# Patient Record
Sex: FEMALE | Race: WHITE | NOT HISPANIC OR LATINO | Employment: OTHER | URBAN - METROPOLITAN AREA
[De-identification: names, ages, dates, MRNs, and addresses within clinical notes are randomized per-mention and may not be internally consistent; named-entity substitution may affect disease eponyms.]

---

## 2018-03-28 ENCOUNTER — RESOLUTE PROFESSIONAL BILLING HOSPITAL PROF FEE (OUTPATIENT)
Dept: HOSPITALIST | Facility: MEDICAL CENTER | Age: 63
End: 2018-03-28
Payer: COMMERCIAL

## 2018-03-28 ENCOUNTER — OFFICE VISIT (OUTPATIENT)
Dept: URGENT CARE | Facility: CLINIC | Age: 63
End: 2018-03-28
Payer: COMMERCIAL

## 2018-03-28 ENCOUNTER — APPOINTMENT (OUTPATIENT)
Dept: RADIOLOGY | Facility: MEDICAL CENTER | Age: 63
DRG: 193 | End: 2018-03-28
Attending: EMERGENCY MEDICINE
Payer: COMMERCIAL

## 2018-03-28 ENCOUNTER — HOSPITAL ENCOUNTER (INPATIENT)
Facility: MEDICAL CENTER | Age: 63
LOS: 3 days | DRG: 193 | End: 2018-03-31
Attending: EMERGENCY MEDICINE | Admitting: HOSPITALIST
Payer: COMMERCIAL

## 2018-03-28 VITALS
OXYGEN SATURATION: 86 % | SYSTOLIC BLOOD PRESSURE: 130 MMHG | HEART RATE: 76 BPM | DIASTOLIC BLOOD PRESSURE: 90 MMHG | RESPIRATION RATE: 16 BRPM | BODY MASS INDEX: 25.11 KG/M2 | TEMPERATURE: 98.2 F | WEIGHT: 136.47 LBS | HEIGHT: 62 IN

## 2018-03-28 DIAGNOSIS — E87.1 HYPONATREMIA: ICD-10-CM

## 2018-03-28 DIAGNOSIS — R01.1 HEART MURMUR: ICD-10-CM

## 2018-03-28 DIAGNOSIS — J44.1 ACUTE EXACERBATION OF CHRONIC OBSTRUCTIVE PULMONARY DISEASE (COPD) (HCC): ICD-10-CM

## 2018-03-28 DIAGNOSIS — J18.9 PNEUMONIA DUE TO INFECTIOUS ORGANISM, UNSPECIFIED LATERALITY, UNSPECIFIED PART OF LUNG: ICD-10-CM

## 2018-03-28 DIAGNOSIS — J40 BRONCHITIS: ICD-10-CM

## 2018-03-28 DIAGNOSIS — R06.00 DYSPNEA, UNSPECIFIED TYPE: ICD-10-CM

## 2018-03-28 PROBLEM — Z72.0 TOBACCO ABUSE: Status: ACTIVE | Noted: 2018-03-28

## 2018-03-28 PROBLEM — J10.1 INFLUENZA B: Status: ACTIVE | Noted: 2018-03-28

## 2018-03-28 PROBLEM — D72.819 LEUKOPENIA: Status: ACTIVE | Noted: 2018-03-28

## 2018-03-28 PROBLEM — I10 HTN (HYPERTENSION): Status: ACTIVE | Noted: 2018-03-28

## 2018-03-28 PROBLEM — R09.02 HYPOXIA: Status: ACTIVE | Noted: 2018-03-28

## 2018-03-28 PROBLEM — E87.6 HYPOKALEMIA: Status: ACTIVE | Noted: 2018-03-28

## 2018-03-28 LAB
ALBUMIN SERPL BCP-MCNC: 4.3 G/DL (ref 3.2–4.9)
ALBUMIN/GLOB SERPL: 1.4 G/DL
ALP SERPL-CCNC: 75 U/L (ref 30–99)
ALT SERPL-CCNC: 34 U/L (ref 2–50)
ANION GAP SERPL CALC-SCNC: 9 MMOL/L (ref 0–11.9)
ANISOCYTOSIS BLD QL SMEAR: ABNORMAL
APTT PPP: 31.4 SEC (ref 24.7–36)
AST SERPL-CCNC: 55 U/L (ref 12–45)
BASOPHILS # BLD AUTO: 0.9 % (ref 0–1.8)
BASOPHILS # BLD: 0.02 K/UL (ref 0–0.12)
BILIRUB SERPL-MCNC: 0.5 MG/DL (ref 0.1–1.5)
BLOOD CULTURE HOLD CXBCH: NORMAL
BNP SERPL-MCNC: 53 PG/ML (ref 0–100)
BUN SERPL-MCNC: 9 MG/DL (ref 8–22)
CALCIUM SERPL-MCNC: 9 MG/DL (ref 8.5–10.5)
CHLORIDE SERPL-SCNC: 83 MMOL/L (ref 96–112)
CO2 SERPL-SCNC: 27 MMOL/L (ref 20–33)
CREAT SERPL-MCNC: 0.67 MG/DL (ref 0.5–1.4)
EKG IMPRESSION: NORMAL
EOSINOPHIL # BLD AUTO: 0 K/UL (ref 0–0.51)
EOSINOPHIL NFR BLD: 0 % (ref 0–6.9)
ERYTHROCYTE [DISTWIDTH] IN BLOOD BY AUTOMATED COUNT: 41.9 FL (ref 35.9–50)
FLUAV RNA SPEC QL NAA+PROBE: NEGATIVE
FLUBV RNA SPEC QL NAA+PROBE: POSITIVE
GLOBULIN SER CALC-MCNC: 3.1 G/DL (ref 1.9–3.5)
GLUCOSE SERPL-MCNC: 108 MG/DL (ref 65–99)
HCT VFR BLD AUTO: 47 % (ref 37–47)
HGB BLD-MCNC: 17 G/DL (ref 12–16)
INR PPP: 1.04 (ref 0.87–1.13)
LIPASE SERPL-CCNC: 30 U/L (ref 11–82)
LYMPHOCYTES # BLD AUTO: 0.46 K/UL (ref 1–4.8)
LYMPHOCYTES NFR BLD: 18.4 % (ref 22–41)
MACROCYTES BLD QL SMEAR: ABNORMAL
MAGNESIUM SERPL-MCNC: 1.7 MG/DL (ref 1.5–2.5)
MANUAL DIFF BLD: ABNORMAL
MCH RBC QN AUTO: 31.8 PG (ref 27–33)
MCHC RBC AUTO-ENTMCNC: 36.2 G/DL (ref 33.6–35)
MCV RBC AUTO: 88 FL (ref 81.4–97.8)
MONOCYTES # BLD AUTO: 0.29 K/UL (ref 0–0.85)
MONOCYTES NFR BLD AUTO: 11.4 % (ref 0–13.4)
MORPHOLOGY BLD-IMP: NORMAL
NEUTROPHILS # BLD AUTO: 1.73 K/UL (ref 2–7.15)
NEUTROPHILS NFR BLD: 55.3 % (ref 44–72)
NEUTS BAND NFR BLD MANUAL: 14 % (ref 0–10)
NRBC # BLD AUTO: 0 K/UL
NRBC BLD-RTO: 0 /100 WBC
PHOSPHATE SERPL-MCNC: 3.6 MG/DL (ref 2.5–4.5)
PLATELET # BLD AUTO: 158 K/UL (ref 164–446)
PLATELET BLD QL SMEAR: NORMAL
PMV BLD AUTO: 8.9 FL (ref 9–12.9)
POTASSIUM SERPL-SCNC: 3.5 MMOL/L (ref 3.6–5.5)
PROT SERPL-MCNC: 7.4 G/DL (ref 6–8.2)
PROTHROMBIN TIME: 13.3 SEC (ref 12–14.6)
RBC # BLD AUTO: 5.34 M/UL (ref 4.2–5.4)
RBC BLD AUTO: PRESENT
SODIUM SERPL-SCNC: 119 MMOL/L (ref 135–145)
TROPONIN I SERPL-MCNC: 0.03 NG/ML (ref 0–0.04)
WBC # BLD AUTO: 2.5 K/UL (ref 4.8–10.8)

## 2018-03-28 PROCEDURE — 83735 ASSAY OF MAGNESIUM: CPT

## 2018-03-28 PROCEDURE — 700111 HCHG RX REV CODE 636 W/ 250 OVERRIDE (IP): Performed by: EMERGENCY MEDICINE

## 2018-03-28 PROCEDURE — 94640 AIRWAY INHALATION TREATMENT: CPT

## 2018-03-28 PROCEDURE — 83880 ASSAY OF NATRIURETIC PEPTIDE: CPT

## 2018-03-28 PROCEDURE — 85730 THROMBOPLASTIN TIME PARTIAL: CPT

## 2018-03-28 PROCEDURE — 85610 PROTHROMBIN TIME: CPT

## 2018-03-28 PROCEDURE — A9270 NON-COVERED ITEM OR SERVICE: HCPCS | Performed by: EMERGENCY MEDICINE

## 2018-03-28 PROCEDURE — 700102 HCHG RX REV CODE 250 W/ 637 OVERRIDE(OP): Performed by: HOSPITALIST

## 2018-03-28 PROCEDURE — 770020 HCHG ROOM/CARE - TELE (206)

## 2018-03-28 PROCEDURE — 99223 1ST HOSP IP/OBS HIGH 75: CPT | Performed by: HOSPITALIST

## 2018-03-28 PROCEDURE — 700105 HCHG RX REV CODE 258: Performed by: EMERGENCY MEDICINE

## 2018-03-28 PROCEDURE — 93005 ELECTROCARDIOGRAM TRACING: CPT | Performed by: EMERGENCY MEDICINE

## 2018-03-28 PROCEDURE — 87502 INFLUENZA DNA AMP PROBE: CPT

## 2018-03-28 PROCEDURE — 85007 BL SMEAR W/DIFF WBC COUNT: CPT

## 2018-03-28 PROCEDURE — 85027 COMPLETE CBC AUTOMATED: CPT

## 2018-03-28 PROCEDURE — 700102 HCHG RX REV CODE 250 W/ 637 OVERRIDE(OP): Performed by: EMERGENCY MEDICINE

## 2018-03-28 PROCEDURE — A9270 NON-COVERED ITEM OR SERVICE: HCPCS | Performed by: HOSPITALIST

## 2018-03-28 PROCEDURE — 93005 ELECTROCARDIOGRAM TRACING: CPT

## 2018-03-28 PROCEDURE — 94760 N-INVAS EAR/PLS OXIMETRY 1: CPT

## 2018-03-28 PROCEDURE — 80053 COMPREHEN METABOLIC PANEL: CPT

## 2018-03-28 PROCEDURE — 96374 THER/PROPH/DIAG INJ IV PUSH: CPT

## 2018-03-28 PROCEDURE — 99203 OFFICE O/P NEW LOW 30 MIN: CPT | Performed by: PHYSICIAN ASSISTANT

## 2018-03-28 PROCEDURE — 71045 X-RAY EXAM CHEST 1 VIEW: CPT

## 2018-03-28 PROCEDURE — 83690 ASSAY OF LIPASE: CPT

## 2018-03-28 PROCEDURE — 84484 ASSAY OF TROPONIN QUANT: CPT

## 2018-03-28 PROCEDURE — 99285 EMERGENCY DEPT VISIT HI MDM: CPT

## 2018-03-28 PROCEDURE — 87040 BLOOD CULTURE FOR BACTERIA: CPT

## 2018-03-28 PROCEDURE — 700101 HCHG RX REV CODE 250: Performed by: EMERGENCY MEDICINE

## 2018-03-28 PROCEDURE — 36415 COLL VENOUS BLD VENIPUNCTURE: CPT

## 2018-03-28 PROCEDURE — 700111 HCHG RX REV CODE 636 W/ 250 OVERRIDE (IP): Performed by: HOSPITALIST

## 2018-03-28 PROCEDURE — 96375 TX/PRO/DX INJ NEW DRUG ADDON: CPT

## 2018-03-28 PROCEDURE — 84100 ASSAY OF PHOSPHORUS: CPT

## 2018-03-28 RX ORDER — METHYLPREDNISOLONE SODIUM SUCCINATE 125 MG/2ML
125 INJECTION, POWDER, LYOPHILIZED, FOR SOLUTION INTRAMUSCULAR; INTRAVENOUS ONCE
Status: COMPLETED | OUTPATIENT
Start: 2018-03-28 | End: 2018-03-28

## 2018-03-28 RX ORDER — NICOTINE 21 MG/24HR
21 PATCH, TRANSDERMAL 24 HOURS TRANSDERMAL
Status: DISCONTINUED | OUTPATIENT
Start: 2018-03-28 | End: 2018-03-31 | Stop reason: HOSPADM

## 2018-03-28 RX ORDER — PREDNISONE 20 MG/1
40 TABLET ORAL DAILY
Status: DISCONTINUED | OUTPATIENT
Start: 2018-03-28 | End: 2018-03-31 | Stop reason: HOSPADM

## 2018-03-28 RX ORDER — ONDANSETRON 4 MG/1
4 TABLET, ORALLY DISINTEGRATING ORAL EVERY 4 HOURS PRN
Status: DISCONTINUED | OUTPATIENT
Start: 2018-03-28 | End: 2018-03-31 | Stop reason: HOSPADM

## 2018-03-28 RX ORDER — CEPHALEXIN 500 MG/1
500 CAPSULE ORAL 2 TIMES DAILY
Status: ON HOLD | COMMUNITY
Start: 2018-03-24 | End: 2018-03-31

## 2018-03-28 RX ORDER — LISINOPRIL AND HYDROCHLOROTHIAZIDE 20; 12.5 MG/1; MG/1
1 TABLET ORAL DAILY
COMMUNITY
End: 2018-03-31

## 2018-03-28 RX ORDER — POLYETHYLENE GLYCOL 3350 17 G/17G
1 POWDER, FOR SOLUTION ORAL
Status: DISCONTINUED | OUTPATIENT
Start: 2018-03-28 | End: 2018-03-31 | Stop reason: HOSPADM

## 2018-03-28 RX ORDER — PROMETHAZINE HYDROCHLORIDE 25 MG/1
12.5-25 SUPPOSITORY RECTAL EVERY 4 HOURS PRN
Status: DISCONTINUED | OUTPATIENT
Start: 2018-03-28 | End: 2018-03-31 | Stop reason: HOSPADM

## 2018-03-28 RX ORDER — AMLODIPINE BESYLATE 5 MG/1
5 TABLET ORAL
Status: DISCONTINUED | OUTPATIENT
Start: 2018-03-28 | End: 2018-03-31

## 2018-03-28 RX ORDER — SODIUM CHLORIDE 9 MG/ML
1000 INJECTION, SOLUTION INTRAVENOUS CONTINUOUS
Status: ACTIVE | OUTPATIENT
Start: 2018-03-28 | End: 2018-03-28

## 2018-03-28 RX ORDER — BISACODYL 10 MG
10 SUPPOSITORY, RECTAL RECTAL
Status: DISCONTINUED | OUTPATIENT
Start: 2018-03-28 | End: 2018-03-31 | Stop reason: HOSPADM

## 2018-03-28 RX ORDER — AMOXICILLIN 250 MG
2 CAPSULE ORAL 2 TIMES DAILY
Status: DISCONTINUED | OUTPATIENT
Start: 2018-03-28 | End: 2018-03-31 | Stop reason: HOSPADM

## 2018-03-28 RX ORDER — AZITHROMYCIN 250 MG/1
500 TABLET, FILM COATED ORAL ONCE
Status: COMPLETED | OUTPATIENT
Start: 2018-03-28 | End: 2018-03-28

## 2018-03-28 RX ORDER — IPRATROPIUM BROMIDE AND ALBUTEROL SULFATE 2.5; .5 MG/3ML; MG/3ML
3 SOLUTION RESPIRATORY (INHALATION)
Status: DISCONTINUED | OUTPATIENT
Start: 2018-03-28 | End: 2018-03-29

## 2018-03-28 RX ORDER — AZITHROMYCIN 250 MG/1
500 TABLET, FILM COATED ORAL ONCE
Status: DISCONTINUED | OUTPATIENT
Start: 2018-03-28 | End: 2018-03-28

## 2018-03-28 RX ORDER — LISINOPRIL AND HYDROCHLOROTHIAZIDE 25; 20 MG/1; MG/1
1 TABLET ORAL DAILY
COMMUNITY
End: 2018-03-28

## 2018-03-28 RX ORDER — ONDANSETRON 2 MG/ML
4 INJECTION INTRAMUSCULAR; INTRAVENOUS EVERY 4 HOURS PRN
Status: DISCONTINUED | OUTPATIENT
Start: 2018-03-28 | End: 2018-03-31 | Stop reason: HOSPADM

## 2018-03-28 RX ORDER — AZITHROMYCIN 250 MG/1
250 TABLET, FILM COATED ORAL DAILY
Status: DISCONTINUED | OUTPATIENT
Start: 2018-03-29 | End: 2018-03-29

## 2018-03-28 RX ORDER — IBUPROFEN 200 MG
200 TABLET ORAL EVERY 6 HOURS PRN
COMMUNITY

## 2018-03-28 RX ORDER — AZITHROMYCIN 500 MG/1
500 INJECTION, POWDER, LYOPHILIZED, FOR SOLUTION INTRAVENOUS ONCE
Status: DISCONTINUED | OUTPATIENT
Start: 2018-03-28 | End: 2018-03-28

## 2018-03-28 RX ORDER — OSELTAMIVIR PHOSPHATE 75 MG/1
75 CAPSULE ORAL EVERY 12 HOURS
Status: DISCONTINUED | OUTPATIENT
Start: 2018-03-28 | End: 2018-03-31 | Stop reason: HOSPADM

## 2018-03-28 RX ORDER — AZITHROMYCIN 250 MG/1
250 TABLET, FILM COATED ORAL DAILY
Status: DISCONTINUED | OUTPATIENT
Start: 2018-03-29 | End: 2018-03-28

## 2018-03-28 RX ORDER — ACETAMINOPHEN 325 MG/1
650 TABLET ORAL EVERY 6 HOURS PRN
Status: DISCONTINUED | OUTPATIENT
Start: 2018-03-28 | End: 2018-03-31 | Stop reason: HOSPADM

## 2018-03-28 RX ORDER — CEFTRIAXONE 2 G/1
2 INJECTION, POWDER, FOR SOLUTION INTRAMUSCULAR; INTRAVENOUS ONCE
Status: COMPLETED | OUTPATIENT
Start: 2018-03-28 | End: 2018-03-28

## 2018-03-28 RX ORDER — PROMETHAZINE HYDROCHLORIDE 25 MG/1
12.5-25 TABLET ORAL EVERY 4 HOURS PRN
Status: DISCONTINUED | OUTPATIENT
Start: 2018-03-28 | End: 2018-03-31 | Stop reason: HOSPADM

## 2018-03-28 RX ADMIN — IPRATROPIUM BROMIDE AND ALBUTEROL SULFATE 3 ML: .5; 3 SOLUTION RESPIRATORY (INHALATION) at 15:59

## 2018-03-28 RX ADMIN — SODIUM CHLORIDE 1000 ML: 9 INJECTION, SOLUTION INTRAVENOUS at 13:21

## 2018-03-28 RX ADMIN — AZITHROMYCIN 500 MG: 250 TABLET, FILM COATED ORAL at 13:21

## 2018-03-28 RX ADMIN — CEFTRIAXONE SODIUM 2 G: 2 INJECTION, POWDER, FOR SOLUTION INTRAMUSCULAR; INTRAVENOUS at 13:21

## 2018-03-28 RX ADMIN — AMLODIPINE BESYLATE 5 MG: 5 TABLET ORAL at 15:19

## 2018-03-28 RX ADMIN — METHYLPREDNISOLONE SODIUM SUCCINATE 125 MG: 125 INJECTION, POWDER, FOR SOLUTION INTRAMUSCULAR; INTRAVENOUS at 13:21

## 2018-03-28 RX ADMIN — ENOXAPARIN SODIUM 40 MG: 100 INJECTION SUBCUTANEOUS at 19:59

## 2018-03-28 RX ADMIN — OSELTAMIVIR PHOSPHATE 75 MG: 75 CAPSULE ORAL at 19:58

## 2018-03-28 RX ADMIN — PREDNISONE 40 MG: 20 TABLET ORAL at 15:18

## 2018-03-28 RX ADMIN — IPRATROPIUM BROMIDE AND ALBUTEROL SULFATE 3 ML: .5; 3 SOLUTION RESPIRATORY (INHALATION) at 18:36

## 2018-03-28 ASSESSMENT — COPD QUESTIONNAIRES
HAVE YOU SMOKED AT LEAST 100 CIGARETTES IN YOUR ENTIRE LIFE: YES
DO YOU EVER COUGH UP ANY MUCUS OR PHLEGM?: YES, A FEW DAYS A WEEK OR MONTH
DURING THE PAST 4 WEEKS HOW MUCH DID YOU FEEL SHORT OF BREATH: SOME OF THE TIME
COPD SCREENING SCORE: 6

## 2018-03-28 ASSESSMENT — ENCOUNTER SYMPTOMS
DIARRHEA: 1
MUSCULOSKELETAL NEGATIVE: 1
BLOOD IN STOOL: 0
BACK PAIN: 1
ORTHOPNEA: 0
EYES NEGATIVE: 1
POLYDIPSIA: 1
CLAUDICATION: 0
WEAKNESS: 0
BODY ACHES: 1
FEVER: 0
COUGH: 1
MYALGIAS: 0
CHILLS: 1
VOMITING: 0
EYES NEGATIVE: 1
CHILLS: 0
NEUROLOGICAL NEGATIVE: 1
SINUS PAIN: 0
SPUTUM PRODUCTION: 1
CHILLS: 1
FEVER: 0
FEVER: 1
COUGH: 1
FATIGUE: 1
SHORTNESS OF BREATH: 1
PSYCHIATRIC NEGATIVE: 1
SORE THROAT: 0
NAUSEA: 0
ABDOMINAL PAIN: 0
MYALGIAS: 0
PALPITATIONS: 0
SHORTNESS OF BREATH: 1

## 2018-03-28 ASSESSMENT — COGNITIVE AND FUNCTIONAL STATUS - GENERAL
DAILY ACTIVITIY SCORE: 24
SUGGESTED CMS G CODE MODIFIER MOBILITY: CH
MOBILITY SCORE: 24
SUGGESTED CMS G CODE MODIFIER DAILY ACTIVITY: CH

## 2018-03-28 ASSESSMENT — LIFESTYLE VARIABLES
DO YOU DRINK ALCOHOL: YES
TOTAL SCORE: 0
HAVE PEOPLE ANNOYED YOU BY CRITICIZING YOUR DRINKING: NO
EVER HAD A DRINK FIRST THING IN THE MORNING TO STEADY YOUR NERVES TO GET RID OF A HANGOVER: NO
HOW MANY TIMES IN THE PAST YEAR HAVE YOU HAD 5 OR MORE DRINKS IN A DAY: 0
TOTAL SCORE: 0
TOTAL SCORE: 0
AVERAGE NUMBER OF DAYS PER WEEK YOU HAVE A DRINK CONTAINING ALCOHOL: 7
ON A TYPICAL DAY WHEN YOU DRINK ALCOHOL HOW MANY DRINKS DO YOU HAVE: 2
HAVE YOU EVER FELT YOU SHOULD CUT DOWN ON YOUR DRINKING: NO
EVER HAD A DRINK FIRST THING IN THE MORNING TO STEADY YOUR NERVES TO GET RID OF A HANGOVER: NO
HAVE PEOPLE ANNOYED YOU BY CRITICIZING YOUR DRINKING: NO
HAVE YOU EVER FELT YOU SHOULD CUT DOWN ON YOUR DRINKING: NO
ALCOHOL_USE: YES
CONSUMPTION TOTAL: POSITIVE
TOTAL SCORE: 0
CONSUMPTION TOTAL: INCOMPLETE
EVER_SMOKED: YES
EVER FELT BAD OR GUILTY ABOUT YOUR DRINKING: NO
EVER FELT BAD OR GUILTY ABOUT YOUR DRINKING: NO
TOTAL SCORE: 0
TOTAL SCORE: 0

## 2018-03-28 ASSESSMENT — PAIN SCALES - GENERAL
PAINLEVEL_OUTOF10: 6
PAINLEVEL_OUTOF10: 0
PAINLEVEL_OUTOF10: 0
PAINLEVEL_OUTOF10: 6

## 2018-03-28 NOTE — ED NOTES
Lab called with critical result of sodium at 1311, WBC 1310. Critical lab result read back to lab.   Dr. Enriquez notified of critical lab result at 1314.  Critical lab result read back by Dr. Enriquez.

## 2018-03-28 NOTE — ED NOTES
Med rec updated and complete  Allergies reviewed  Pt had RX bottle of LISINOPRIL/ HCTZ at bedside,  Pt reports had an old RX bottle of KEFLEX 500MG, took it on 3/26/2018 twice a day then stopped it on 3/26/2018, pt thought she had bronchitis.  Pt reports no vitamins.

## 2018-03-28 NOTE — ASSESSMENT & PLAN NOTE
She had been on lisinopril-hctz 20-12.5 daily which will be changed to norvasc given low sodium.  Continue to monitor

## 2018-03-28 NOTE — NON-PROVIDER
Hospital Medicine History and Physical    Date of Service  3/28/2018    Chief Complaint  Chief Complaint   Patient presents with   • Shortness of Breath   • Cough   • Chest Wall Pain       History of Presenting Illness  62 y.o. female who presented 3/28/2018 to urgent care due to consistent, progressive SOB, dry, hacking cough, and chest pain that has been since Saturday. She was told to come to the ED as she was 86% on room air while at the clinic. Her chest pain is only present when coughing and correlated with chest wall, rather than pain due to cardiovascular issues. During this time she has also had constant rhinorrhea, but is unable to describe the discharge. It does not have a foul smell or taste. She denies any sore throat, sinus pressure, ear pain, rashes, or myalgias. She has not had any palpitations or shortness of breath while laying down that has caused her to wake from sleep. She has stairs in her house and is able to climb them multiple times a day without issue or SOB. Due to her job she is in contact with many people and some have had similar illness to her  She drinks approximately 8 large glasses of water a day and is on hctz, which could be attributing to her hyponatremia.    Primary Care Physician  Pcp Pt States None    Consultants  Dr. Juan F Fernandez    Code Status  Full    Review of Systems  Review of Systems   Constitutional: Positive for malaise/fatigue. Negative for chills and fever.        Goes between feeling hot and cold, but never checked her temperature at home, fatigue   HENT: Positive for congestion. Negative for ear pain, sinus pain and sore throat.    Eyes: Negative.    Respiratory: Positive for cough and shortness of breath.         Chest pain   Cardiovascular: Negative for chest pain, palpitations, orthopnea, claudication and leg swelling.   Gastrointestinal: Positive for diarrhea. Negative for abdominal pain, blood in stool, nausea and vomiting.   Genitourinary: Negative for  dysuria, frequency and urgency.   Musculoskeletal: Negative.  Negative for myalgias.   Skin: Negative.  Negative for rash.   Neurological: Negative.  Negative for weakness.   Endo/Heme/Allergies: Positive for polydipsia.   Psychiatric/Behavioral: Negative.         Past Medical History  Past Medical History:   Diagnosis Date   • Hypertension currently controlled on lisinopril-hctz prescribed by her PCP.        Surgical History  -  Section in   - Breast Biopsy in   - Appendectomy in     Medications  - Lisinopril-hctz 20-12.5 daily    Family History  No known family history of lung disease.  Mother - Alzheimer Disease  Father - Type II Diabetes Mellitus and CHF.  age 82 or 83.     Social History  Social History   Substance Use Topics   • Smoking status: Current Every Day Smoker     Packs/day: 2.50     Years: 0.00     Types: Cigarettes   • Smokeless tobacco: Never Used   • Alcohol use Daily alcohol use. Beer or wine, no hard liquor.       Allergies  Allergies   Allergen Reactions   • Penicillins Unspecified     Pt was child when told she was allergic        Physical Exam  Laboratory   Hemodynamics  Temp (24hrs), Av.9 °C (98.4 °F), Min:36.9 °C (98.4 °F), Max:36.9 °C (98.4 °F)   Temperature: 36.9 °C (98.4 °F)  Pulse  Av  Min: 72  Max: 72 Heart Rate (Monitored): 70  Blood Pressure: 132/69, NIBP: (!) 177/72      Respiratory      Respiration: 19, Pulse Oximetry: 92 %             Physical Exam   Constitutional: She appears well-developed and well-nourished.   Cardiovascular: Normal rate, regular rhythm, normal heart sounds and intact distal pulses.    No murmur, rubs, or gallops present. No peripheral edema present.   Pulmonary/Chest: Effort normal.   Bilateral crackles in the lung bases. No wheeze present.   Abdominal: Soft.   Non tender.   Skin: Skin is warm and dry.       Recent Labs      18   1204   WBC  2.5*   RBC  5.34   HEMOGLOBIN  17.0*   HEMATOCRIT  47.0   MCV  88.0   MCH  31.8    MCHC  36.2*   RDW  41.9   PLATELETCT  158*   MPV  8.9*     Recent Labs      03/28/18   1204   SODIUM  119*   POTASSIUM  3.5*   CHLORIDE  83*   CO2  27   GLUCOSE  108*   BUN  9   CREATININE  0.67   CALCIUM  9.0     Recent Labs      03/28/18   1204   ALTSGPT  34   ASTSGOT  55*   ALKPHOSPHAT  75   TBILIRUBIN  0.5   LIPASE  30   GLUCOSE  108*     Recent Labs      03/28/18   1204   APTT  31.4   INR  1.04     Recent Labs      03/28/18   1204   BNPBTYPENAT  53         Lab Results   Component Value Date    TROPONINI 0.03 03/28/2018     Urinalysis:  No results found for: SPECGRAVITY, GLUCOSEUR, KETONES, NITRITE, WBCURINE, RBCURINE, BACTERIA, EPITHELCELL     Imaging  Portable CXR ordered on Admission:  Findings: Mild lung base interstitial opacities.  There is no evidence of pleural effusion.  The heart is normal in size.  No pneumothorax.     Assessment/Plan     - Chest Infection        - Oxygen saturation 86% on room air on admission, was started on O2 therapy per protocol until O2 sat at 94% or greater. O2 sat was up to 92% while taking H&P.       - CXR taken on admission       - To start patient on combivent and azithromycin  - Hyponatremia       - Na 119 on admission       - Polydypsia, will have patient decrease her water intake by half       - BMP ordered for tomorrow to recheck levels  - Hypokalemia       - K 3.5 on admission       - Hctz has been stopped and she has now been started on Norvasc       - BMP ordered for tomorrow to recheck levels  - Leukopenia       - 2.5 on admission       - No previous history of leukopenia       - CBC ordered for tomorrow to trend  - Hypertension       - Currently on lisinopril-hztc, which will now be lisinopril and norvasc       - 177/71 mmHg on admission, however, patient is anxious and not happy about being admitted into hospital. BP was 130/90 while at urgent care.      Hypoxia- (present on admission)   Assessment & Plan    Oxygen saturation was 86% on room air.         Hypokalemia- (present on admission)   Assessment & Plan    K 3.5 on admit.  Hold hctz.        HTN (hypertension)- (present on admission)   Assessment & Plan    She is on lisinopril-hctz 20-12.5 daily        Hyponatremia- (present on admission)   Assessment & Plan    Sodium is 119 on admit.          Leukopenia- (present on admission)   Assessment & Plan    WBC 2.5 on admit.  She does not have a history of low WBC                 Holli Wade, [facility]

## 2018-03-28 NOTE — PROGRESS NOTES
Patient admitted to -829 . Tele box on, patient assessed. Oriented patient to room, skylight, and how to use call light. Call light within reach, bed alarm on, treaded socks on.

## 2018-03-28 NOTE — ED PROVIDER NOTES
ED Provider Note    Scribed for Lala Enriquez M.D. by Lianna Zepeda. 3/28/2018  12:04 PM    Primary care provider: None  Means of arrival: Walk in  History obtained from: Patient  History limited by: None    CHIEF COMPLAINT  Chief Complaint   Patient presents with   • Shortness of Breath       HPI  Luc Corrales is a 62 y.o. female who presents to the Emergency Department for shortness of breath with an onset of 4 days.  Symptoms developed five days ago with a productive cough. She began to experience intermittent shortness of breath the following day which is exacerbated with exertion and smoking. She denies use of inhalers or oxygen for her symptoms. She complains of chest wall pain only with coughing. Associated symptoms include decrease in appetite.  Negative for fevers, chills, nausea, vomiting or abdominal pain. History of hypertension. She denies a history of COPD, emphysema, PE or MI.      REVIEW OF SYSTEMS  HEENT:  No ear pain, congestion, or sore throat   EYES: no discharge, redness, or vision changes  CARDIAC: Positive chest wall pain.  PULMONARY: Positive shortness of breath and productive cough.  GI: No nausea, vomiting or abdominal pain.  : no dysuria, back pain, or hematuria   Neuro: no weakness, numbness, aphasia, or headache  Musculoskeletal: no swelling, deformity, pain, or joint swelling  Endocrine: No fevers or chills.  SKIN: no rash, erythema, or contusions   Constitutional: Positive decrease in appetite.    See history of present illness. All other systems are negative. C.      PAST MEDICAL HISTORY  Patient has a past medical history of Hypertension. She denies a history of COPD, emphysema, PE or MI.      SURGICAL HISTORY  Patient denies a pertinent surgical history.       SOCIAL HISTORY  Social History   Substance Use Topics   • Smoking status: Current Every Day Smoker     Packs/day: 2.50     Years: 0.00     Types: Cigarettes   • Smokeless tobacco: Never Used      History   Drug use:  "No       FAMILY HISTORY  History reviewed. No pertinent family history.      CURRENT MEDICATIONS  Home Medications     Reviewed by Suellen Campos (Pharmacy Tech) on 03/28/18 at 1314  Med List Status: Complete   Medication Last Dose Status   cephALEXin (KEFLEX) 500 MG Cap 3/26/2018 Active   ibuprofen (MOTRIN) 200 MG Tab 3/27/2018 Active   lisinopril-hydrochlorothiazide (PRINZIDE, ZESTORETIC) 20-12.5 MG per tablet 3/28/2018 Active                ALLERGIES  Allergies   Allergen Reactions   • Penicillins Unspecified     Pt was child when told she was allergic       PHYSICAL EXAM  VITAL SIGNS: /69   Pulse 72   Temp 36.9 °C (98.4 °F)   Resp 19   Ht 1.549 m (5' 1\")   Wt 62.4 kg (137 lb 9.1 oz)   SpO2 92%   BMI 25.99 kg/m²       Constitutional: Well developed, Well nourished, No acute distress, Non-toxic appearance.   HEENT: Normocephalic, Atraumatic,  external ears normal, pharynx pink,  Mucous  Membranes moist, No rhinorrhea or mucosal edema  Eyes: PERRL, EOMI, Conjunctiva normal, No discharge.   Neck: Normal range of motion, No tenderness, Supple, No stridor.   Lymphatic: No lymphadenopathy    Cardiovascular: Regular Rate and Rhythm, No rubs, or gallops. Tight blowing murmur left sternal border  Thorax & Lungs: Lungs clear to auscultation bilaterally, No respiratory distress, No wheezes, rhales or rhonchi, No chest wall tenderness.   Abdomen: Bowel sounds normal, Soft, non tender, non distended,  No pulsatile masses., no rebound guarding or peritoneal signs.   Skin: Warm, Dry, No erythema, No rash,   Back:  No CVA tenderness,  No spinal tenderness, bony crepitance, step offs, or instability.   Neurologic: Alert & oriented x 3, Normal motor function, Normal sensory function, No focal deficits noted. Normal reflexes. Normal Cranial Nerves.  Extremities: Equal, intact distal pulses, No cyanosis, clubbing or edema,  No tenderness.   Musculoskeletal: Good range of motion in all major joints. No tenderness " to palpation or major deformities noted.       DIAGNOSTIC STUDIES / PROCEDURES    LABS  Results for orders placed or performed during the hospital encounter of 03/28/18   CBC with Differential   Result Value Ref Range    WBC 2.5 (LL) 4.8 - 10.8 K/uL    RBC 5.34 4.20 - 5.40 M/uL    Hemoglobin 17.0 (H) 12.0 - 16.0 g/dL    Hematocrit 47.0 37.0 - 47.0 %    MCV 88.0 81.4 - 97.8 fL    MCH 31.8 27.0 - 33.0 pg    MCHC 36.2 (H) 33.6 - 35.0 g/dL    RDW 41.9 35.9 - 50.0 fL    Platelet Count 158 (L) 164 - 446 K/uL    MPV 8.9 (L) 9.0 - 12.9 fL    Nucleated RBC 0.00 /100 WBC    NRBC (Absolute) 0.00 K/uL    Neutrophils-Polys 55.30 44.00 - 72.00 %    Lymphocytes 18.40 (L) 22.00 - 41.00 %    Monocytes 11.40 0.00 - 13.40 %    Eosinophils 0.00 0.00 - 6.90 %    Basophils 0.90 0.00 - 1.80 %    Neutrophils (Absolute) 1.73 (L) 2.00 - 7.15 K/uL    Lymphs (Absolute) 0.46 (L) 1.00 - 4.80 K/uL    Monos (Absolute) 0.29 0.00 - 0.85 K/uL    Eos (Absolute) 0.00 0.00 - 0.51 K/uL    Baso (Absolute) 0.02 0.00 - 0.12 K/uL    Anisocytosis 1+     Macrocytosis 1+    Complete Metabolic Panel (CMP)   Result Value Ref Range    Sodium 119 (LL) 135 - 145 mmol/L    Potassium 3.5 (L) 3.6 - 5.5 mmol/L    Chloride 83 (L) 96 - 112 mmol/L    Co2 27 20 - 33 mmol/L    Anion Gap 9.0 0.0 - 11.9    Glucose 108 (H) 65 - 99 mg/dL    Bun 9 8 - 22 mg/dL    Creatinine 0.67 0.50 - 1.40 mg/dL    Calcium 9.0 8.5 - 10.5 mg/dL    AST(SGOT) 55 (H) 12 - 45 U/L    ALT(SGPT) 34 2 - 50 U/L    Alkaline Phosphatase 75 30 - 99 U/L    Total Bilirubin 0.5 0.1 - 1.5 mg/dL    Albumin 4.3 3.2 - 4.9 g/dL    Total Protein 7.4 6.0 - 8.2 g/dL    Globulin 3.1 1.9 - 3.5 g/dL    A-G Ratio 1.4 g/dL   Btype Natriuretic Peptide (BNP)   Result Value Ref Range    B Natriuretic Peptide 53 0 - 100 pg/mL   Prothrombin Time (PT/INR)   Result Value Ref Range    PT 13.3 12.0 - 14.6 sec    INR 1.04 0.87 - 1.13   APTT   Result Value Ref Range    APTT 31.4 24.7 - 36.0 sec   Lipase   Result Value Ref Range     Lipase 30 11 - 82 U/L   Troponin STAT   Result Value Ref Range    Troponin I 0.03 0.00 - 0.04 ng/mL   Magnesium   Result Value Ref Range    Magnesium 1.7 1.5 - 2.5 mg/dL   Phosphorus   Result Value Ref Range    Phosphorus 3.6 2.5 - 4.5 mg/dL   ESTIMATED GFR   Result Value Ref Range    GFR If African American >60 >60 mL/min/1.73 m 2    GFR If Non African American >60 >60 mL/min/1.73 m 2   BLOOD CULTURE,HOLD   Result Value Ref Range    Blood Culture Hold Collected    DIFFERENTIAL MANUAL   Result Value Ref Range    Bands-Stabs 14.00 (H) 0.00 - 10.00 %    Manual Diff Status PERFORMED    PERIPHERAL SMEAR REVIEW   Result Value Ref Range    Peripheral Smear Review see below    PLATELET ESTIMATE   Result Value Ref Range    Plt Estimation Normal    MORPHOLOGY   Result Value Ref Range    RBC Morphology Present    EKG (ER)   Result Value Ref Range    Report       [facility] Emergency Dept.    Test Date:  2018  Pt Name:    ALONZO COLMENARES              Department: ER  MRN:        [id number]                      Room:       [facility]  Gender:     Female                       Technician: [id number]  :        1955                   Requested By:ER TRIAGE PROTOCOL  Order #:    [id number]                    Reading MD:    Measurements  Intervals                                Axis  Rate:       69                           P:          70  KY:         156                          QRS:        63  QRSD:       82                           T:          56  QT:         432  QTc:        463    Interpretive Statements  SINUS RHYTHM  PROBABLE LEFT ATRIAL ABNORMALITY  No previous ECG available for comparison        All labs reviewed by me.      EKG  12 lead EKG; Interpreted by emergency department physician    Rhythm: Normal Sinus Rhythm   Rate: 69  Axis: Normal  R Wave: Normal R wave progression  Normal ST-T segments  ST Segments: No ST segment elevation   T waves: Normal  Q waves: None    Clinical Impression: No acute  changes      RADIOLOGY  DX-CHEST-PORTABLE (1 VIEW)   Final Result      Mild lung base atelectasis/possible interstitial edema or pneumonitis.        The radiologist's interpretation of all radiological studies have been reviewed by me.      COURSE & MEDICAL DECISION MAKING  Pertinent Labs & Imaging studies reviewed. (See chart for details)    Differential Diagnoses include but are not limited to: bronchitis, cardiac ischemia, COPD exacerbation or influenza     12:00 PM Obtained and reviewed patient's electronic medical record which indicates a history of hypertension. Office visit today for a cough.    12:06 PM Patient seen and examined at bedside. Patient presents for shortness of breath.     Initial orders in the Emergency Department included XR chest, EKG and laboratory testing: influenza, CBC with differential, CMP, estimated GFR, BNP, prothrombin time, APTT, lipase, troponin, magnesium and phosphorus.     12:42 PM XR shows mild lung base atelectasis. Ordered 2 g of Rocephin IV, 125 mg of solumedrol and 500 mg of Azithromycin PO. I also ordered normal saline for the hyponatremia.    1:04 PM Spoke with Dr. Fernandez, Hospitalist, regarding the patient's presenting symptoms and diagnostic results. She will be admitted for a new murmur, dyspnea and COPD exacerbation.       DISPOSITION  Patient will be admitted to Dr. Fernandez, Hospitalist, in stable condition.      DIAGNOSIS  1. Acute exacerbation of chronic obstructive pulmonary disease (COPD) (CMS-HCC)    2. Heart murmur    3. Dyspnea, unspecified type    4. Hyponatremia           The note accurately reflects work and decisions made by me.  Lala Enriquez  3/28/2018  2:13 PM     Lianna TERRELL ([facility]), am scribing for, and in the presence of, Lala Enriquez M.D.    Electronically signed by: Lianna Zepdea ([facility]), 3/28/2018    Lala TERRELL M.D. personally performed the services described in this documentation, as scribed by Lianna Zepeda in my presence,  and it is both accurate and complete.

## 2018-03-28 NOTE — ASSESSMENT & PLAN NOTE
Oxygen saturation was 86% on room air.  Improving. Hopefully will be discharged without oxygen next 1-2 days

## 2018-03-28 NOTE — PROGRESS NOTES
"Subjective:      Luc Corrales is a 62 y.o. female who presents with Cough (x3days, chest congestion); Shortness of Breath (SOB, balance is off); Chills; Generalized Body Aches (x5days); Runny Nose; Medication Refill (lisinopril); Back Pain (upper back pain); and Fatigue            Cough   This is a new problem. The current episode started in the past 7 days. The problem has been unchanged. The problem occurs every few minutes. The cough is productive of sputum. Associated symptoms include chills and shortness of breath. Pertinent negatives include no fever or myalgias. Nothing aggravates the symptoms. She has tried nothing for the symptoms. The treatment provided no relief.   Shortness of Breath   Associated symptoms include sputum production. Pertinent negatives include no fever.   Chills   Associated symptoms include chills, coughing and fatigue. Pertinent negatives include no fever or myalgias.   Generalized Body Aches   Associated symptoms include chills, coughing and fatigue. Pertinent negatives include no fever or myalgias.   Back Pain   Pertinent negatives include no fever.   Fatigue   Associated symptoms include chills, coughing and fatigue. Pertinent negatives include no fever or myalgias.       Review of Systems   Constitutional: Positive for chills and fatigue. Negative for fever.   HENT: Negative.    Eyes: Negative.    Respiratory: Positive for cough, sputum production and shortness of breath.    Musculoskeletal: Positive for back pain. Negative for myalgias.   Skin: Negative.           Objective:     /90   Pulse 76   Temp 36.8 °C (98.2 °F)   Resp 16   Ht 1.562 m (5' 1.5\")   Wt 61.9 kg (136 lb 7.4 oz)   SpO2 (!) 86%   BMI 25.37 kg/m²      Physical Exam   Constitutional: She is oriented to person, place, and time. She appears well-developed and well-nourished. No distress.   HENT:   Head: Normocephalic and atraumatic.   Eyes: EOM are normal. Pupils are equal, round, and reactive to " light.   Neck: Normal range of motion. Neck supple.   Cardiovascular: Normal rate.    Neurological: She is alert and oriented to person, place, and time.   Skin: Skin is warm and dry.   Psychiatric: She has a normal mood and affect. Her behavior is normal.   Nursing note and vitals reviewed.    Active Ambulatory Problems     Diagnosis Date Noted   • No Active Ambulatory Problems     Resolved Ambulatory Problems     Diagnosis Date Noted   • No Resolved Ambulatory Problems     No Additional Past Medical History     No current outpatient prescriptions on file prior to visit.     No current facility-administered medications on file prior to visit.      Social History     Social History   • Marital status:      Spouse name: N/A   • Number of children: N/A   • Years of education: N/A     Occupational History   • Not on file.     Social History Main Topics   • Smoking status: Current Every Day Smoker     Packs/day: 2.50     Years: 0.00     Types: Cigarettes   • Smokeless tobacco: Never Used   • Alcohol use Not on file   • Drug use: Unknown   • Sexual activity: Not on file     Other Topics Concern   • Not on file     Social History Narrative   • No narrative on file     No family history on file.  Penicillins              Assessment/Plan:     ·  cough, sob; poss flu sx  · Low sat%86 RA      · No XR here today; will send pt to ER based on low sat% for eval/tx [pt declines EMS]  · Charge notified

## 2018-03-28 NOTE — ED TRIAGE NOTES
Amb to triage, sent from  for further eval. Pt c/o sob, cough, congestion, chest wall pain & back pain x 5 days, getting progressively worse.

## 2018-03-28 NOTE — ED NOTES
Rounded on patient.  Patient stated she would like a small snack.  Reviewed diet order in the computer.  Gave patient crackers and water per request.

## 2018-03-29 PROBLEM — J18.9 PNEUMONIA DUE TO INFECTIOUS ORGANISM: Status: ACTIVE | Noted: 2018-03-29

## 2018-03-29 PROBLEM — J96.01 ACUTE RESPIRATORY FAILURE WITH HYPOXIA (HCC): Status: ACTIVE | Noted: 2018-03-29

## 2018-03-29 LAB
ANION GAP SERPL CALC-SCNC: 9 MMOL/L (ref 0–11.9)
BUN SERPL-MCNC: 8 MG/DL (ref 8–22)
CALCIUM SERPL-MCNC: 8.4 MG/DL (ref 8.5–10.5)
CHLORIDE SERPL-SCNC: 89 MMOL/L (ref 96–112)
CO2 SERPL-SCNC: 26 MMOL/L (ref 20–33)
CREAT SERPL-MCNC: 0.51 MG/DL (ref 0.5–1.4)
GLUCOSE SERPL-MCNC: 201 MG/DL (ref 65–99)
POTASSIUM SERPL-SCNC: 3.5 MMOL/L (ref 3.6–5.5)
SODIUM SERPL-SCNC: 124 MMOL/L (ref 135–145)

## 2018-03-29 PROCEDURE — A9270 NON-COVERED ITEM OR SERVICE: HCPCS | Performed by: HOSPITALIST

## 2018-03-29 PROCEDURE — 99407 BEHAV CHNG SMOKING > 10 MIN: CPT

## 2018-03-29 PROCEDURE — 700102 HCHG RX REV CODE 250 W/ 637 OVERRIDE(OP): Performed by: HOSPITALIST

## 2018-03-29 PROCEDURE — 770020 HCHG ROOM/CARE - TELE (206)

## 2018-03-29 PROCEDURE — A9270 NON-COVERED ITEM OR SERVICE: HCPCS | Performed by: INTERNAL MEDICINE

## 2018-03-29 PROCEDURE — 94760 N-INVAS EAR/PLS OXIMETRY 1: CPT

## 2018-03-29 PROCEDURE — 700101 HCHG RX REV CODE 250: Performed by: EMERGENCY MEDICINE

## 2018-03-29 PROCEDURE — 80048 BASIC METABOLIC PNL TOTAL CA: CPT

## 2018-03-29 PROCEDURE — 99233 SBSQ HOSP IP/OBS HIGH 50: CPT | Performed by: INTERNAL MEDICINE

## 2018-03-29 PROCEDURE — 94640 AIRWAY INHALATION TREATMENT: CPT

## 2018-03-29 PROCEDURE — 36415 COLL VENOUS BLD VENIPUNCTURE: CPT

## 2018-03-29 PROCEDURE — 700102 HCHG RX REV CODE 250 W/ 637 OVERRIDE(OP): Performed by: INTERNAL MEDICINE

## 2018-03-29 PROCEDURE — 700111 HCHG RX REV CODE 636 W/ 250 OVERRIDE (IP): Performed by: HOSPITALIST

## 2018-03-29 RX ORDER — LEVOFLOXACIN 750 MG/1
750 TABLET, FILM COATED ORAL DAILY
Status: DISCONTINUED | OUTPATIENT
Start: 2018-03-29 | End: 2018-03-31 | Stop reason: HOSPADM

## 2018-03-29 RX ORDER — LEVOFLOXACIN 750 MG/1
750 TABLET, FILM COATED ORAL DAILY
Status: DISCONTINUED | OUTPATIENT
Start: 2018-03-29 | End: 2018-03-29

## 2018-03-29 RX ORDER — POTASSIUM CHLORIDE 20 MEQ/1
40 TABLET, EXTENDED RELEASE ORAL ONCE
Status: COMPLETED | OUTPATIENT
Start: 2018-03-29 | End: 2018-03-29

## 2018-03-29 RX ORDER — IPRATROPIUM BROMIDE AND ALBUTEROL SULFATE 2.5; .5 MG/3ML; MG/3ML
3 SOLUTION RESPIRATORY (INHALATION)
Status: DISCONTINUED | OUTPATIENT
Start: 2018-03-29 | End: 2018-03-30

## 2018-03-29 RX ADMIN — IPRATROPIUM BROMIDE AND ALBUTEROL SULFATE 3 ML: .5; 3 SOLUTION RESPIRATORY (INHALATION) at 14:21

## 2018-03-29 RX ADMIN — OSELTAMIVIR PHOSPHATE 75 MG: 75 CAPSULE ORAL at 08:21

## 2018-03-29 RX ADMIN — OSELTAMIVIR PHOSPHATE 75 MG: 75 CAPSULE ORAL at 21:48

## 2018-03-29 RX ADMIN — BENZOCAINE AND MENTHOL 1 LOZENGE: 15; 3.6 LOZENGE ORAL at 17:07

## 2018-03-29 RX ADMIN — IPRATROPIUM BROMIDE AND ALBUTEROL SULFATE 3 ML: .5; 3 SOLUTION RESPIRATORY (INHALATION) at 06:13

## 2018-03-29 RX ADMIN — POTASSIUM CHLORIDE 40 MEQ: 1500 TABLET, EXTENDED RELEASE ORAL at 08:22

## 2018-03-29 RX ADMIN — IPRATROPIUM BROMIDE AND ALBUTEROL SULFATE 3 ML: .5; 3 SOLUTION RESPIRATORY (INHALATION) at 10:08

## 2018-03-29 RX ADMIN — PREDNISONE 40 MG: 20 TABLET ORAL at 08:21

## 2018-03-29 RX ADMIN — AMLODIPINE BESYLATE 5 MG: 5 TABLET ORAL at 08:21

## 2018-03-29 RX ADMIN — LEVOFLOXACIN 750 MG: 750 TABLET, FILM COATED ORAL at 17:07

## 2018-03-29 RX ADMIN — AZITHROMYCIN 250 MG: 250 TABLET, FILM COATED ORAL at 08:21

## 2018-03-29 RX ADMIN — STANDARDIZED SENNA CONCENTRATE AND DOCUSATE SODIUM 2 TABLET: 8.6; 5 TABLET, FILM COATED ORAL at 21:50

## 2018-03-29 ASSESSMENT — ENCOUNTER SYMPTOMS
EYE PAIN: 0
CONSTIPATION: 0
PSYCHIATRIC NEGATIVE: 1
TINGLING: 0
COUGH: 1
PALPITATIONS: 0
CARDIOVASCULAR NEGATIVE: 1
DEPRESSION: 0
SPUTUM PRODUCTION: 1
PHOTOPHOBIA: 0
BLURRED VISION: 0
BRUISES/BLEEDS EASILY: 0
DIZZINESS: 0
HEMOPTYSIS: 0
HEARTBURN: 0
ORTHOPNEA: 0
MYALGIAS: 1
EYES NEGATIVE: 1
DIARRHEA: 0
SHORTNESS OF BREATH: 0
FLANK PAIN: 0
FEVER: 0
WEIGHT LOSS: 0
CHILLS: 1
GASTROINTESTINAL NEGATIVE: 1
SPEECH CHANGE: 0
DOUBLE VISION: 0
WHEEZING: 0

## 2018-03-29 ASSESSMENT — PATIENT HEALTH QUESTIONNAIRE - PHQ9
1. LITTLE INTEREST OR PLEASURE IN DOING THINGS: NOT AT ALL
SUM OF ALL RESPONSES TO PHQ9 QUESTIONS 1 AND 2: 0
2. FEELING DOWN, DEPRESSED, IRRITABLE, OR HOPELESS: NOT AT ALL

## 2018-03-29 ASSESSMENT — PAIN SCALES - GENERAL
PAINLEVEL_OUTOF10: 0

## 2018-03-29 ASSESSMENT — LIFESTYLE VARIABLES: SUBSTANCE_ABUSE: 0

## 2018-03-29 NOTE — CARE PLAN
Problem: Bronchoconstriction:  Goal: Improve in air movement and diminished wheezing  Outcome: PROGRESSING AS EXPECTED    Intervention: Implement inhaled treatments  DUO Q4, appropriate for QID      Problem: Oxygenation:  Goal: Maintain adequate oxygenation dependent on patient condition  Outcome: PROGRESSING AS EXPECTED    Intervention: Manage oxygen therapy by monitoring pulse oximetry and/or ABG values  1.5lpm NC please titrate as tolerated

## 2018-03-29 NOTE — PROGRESS NOTES
Report received from CANDELARIA oCllado. Assumed care of patient at 1845. Telemetry functioning properly. Universal fall precautions in place. Will continue to monitor.

## 2018-03-29 NOTE — PROGRESS NOTES
from Lab called with critical result of Flu B positive at 1658. Critical lab result read back to .   Dr. Fernandez notified of critical lab result at 1735.  Critical lab result read back by Dr. Fernandez.

## 2018-03-29 NOTE — RESPIRATORY CARE
COPD EDUCATION by COPD CLINICAL EDUCATOR  3/29/2018  at  10:20 AM by Tali Krishnan     Patient interviewed by COPD education team.  Patient unable to participate in full program.  Short intervention has been conducted and a comprehensive packet including information about smoking cessation given.

## 2018-03-29 NOTE — CARE PLAN
"Problem: Safety  Goal: Will remain free from falls  Outcome: PROGRESSING AS EXPECTED  [place] fall precautions in place; bed low and locked, non-slip socks on. Pt ambulating safely independently. See \"all risk\" flow sheet for detail. Will continue to monitor.      "

## 2018-03-29 NOTE — H&P
" Hospital Medicine History and Physical    Date of Service  3/28/2018    Chief Complaint  Chief Complaint   Patient presents with   • Shortness of Breath   • Cough   • Chest Wall Pain       History of Presenting Illness  62 y.o. female who presented 3/28/2018 with shortness of breath. Ms. Corrales has a history of hypertension and ongoing smoking dependence developed a cough about 4 days ago with progressive shortness of breath he also states that she felt \"exhausted\" inside a hard time getting out of bed. He said chest pain substernal in nature with her cough and has felt \"hot and cold\". She  Presented to urgent care where she is found to have oxygen saturation of 86% therefore was referred to the emergency room for further workup. Here, she is found to have markedly low sodium at 119 and influenza B positive therefore she'll be started on Tamiflu midexpiratory telemetry floor.   Patient notes that she drinks excessive amounts of water on a daily basis basically explains her hyponatremia. Discussed smoking cessation at great length and she is interested in this. She is not on home oxygen.   Primary Care Physician  Pcp Pt States None    Consultants  none    Code Status  full    Review of Systems  Review of Systems   Constitutional: Positive for chills and fever.   HENT: Positive for congestion.    Respiratory: Positive for cough and shortness of breath.    Neurological:        Feeling off balance the past few days   All other systems reviewed and are negative.       Past Medical History  Past Medical History:   Diagnosis Date   • Hypertension        Surgical History  No past surgical history on file.  Breast biopsy, C/S, [facility]  Medications  No current facility-administered medications on file prior to encounter.      No current outpatient prescriptions on file prior to encounter.   see med rec form    Family History  History reviewed. No pertinent family history.  Father  of heart failure at 82  Social " History  Social History   Substance Use Topics   • Smoking status: Current Every Day Smoker     Packs/day: 2.50     Years: 0.00     Types: Cigarettes   • Smokeless tobacco: Never Used   • Alcohol use Yes   she smokes 2 1/2 packs daily and drinks alcohol socially    Allergies  Allergies   Allergen Reactions   • Penicillins Unspecified     Pt was child when told she was allergic        Physical Exam  Laboratory   Hemodynamics  Temp (24hrs), Av.8 °C (98.2 °F), Min:36.6 °C (97.9 °F), Max:36.9 °C (98.4 °F)   Temperature: 36.6 °C (97.9 °F)  Pulse  Av.2  Min: 72  Max: 98 Heart Rate (Monitored): 79  Blood Pressure: 140/69, NIBP: (!) 176/73      Respiratory      Respiration: 12, Pulse Oximetry: 91 %, O2 Daily Delivery Respiratory : Silicone Nasal Cannula     Given By:: Mouthpiece, Work Of Breathing / Effort: Mild  RUL Breath Sounds: Diminished;Clear, RML Breath Sounds: Diminished;Clear, RLL Breath Sounds: Diminished;Clear, KRIS Breath Sounds: Diminished;Clear, LLL Breath Sounds: Diminished;Clear    Physical Exam   Constitutional: She is oriented to person, place, and time. No distress.   Neck: Neck supple.   Cardiovascular: Normal rate and regular rhythm.    No murmur heard.  Heart sounds are distant though no murmur heard   Pulmonary/Chest:   Decreased air movement, slight expiratory wheezes. No rhonchi, + crackles bilaterally   Abdominal: Soft. She exhibits no distension.   Musculoskeletal: She exhibits no edema.   Neurological: She is alert and oriented to person, place, and time.   Skin: Skin is warm. She is not diaphoretic.   Psychiatric: She has a normal mood and affect. Her behavior is normal.   Nursing note and vitals reviewed.      Recent Labs      18   1204   WBC  2.5*   RBC  5.34   HEMOGLOBIN  17.0*   HEMATOCRIT  47.0   MCV  88.0   MCH  31.8   MCHC  36.2*   RDW  41.9   PLATELETCT  158*   MPV  8.9*     Recent Labs      18   1204   SODIUM  119*   POTASSIUM  3.5*   CHLORIDE  83*   CO2  27    GLUCOSE  108*   BUN  9   CREATININE  0.67   CALCIUM  9.0     Recent Labs      03/28/18   1204   ALTSGPT  34   ASTSGOT  55*   ALKPHOSPHAT  75   TBILIRUBIN  0.5   LIPASE  30   GLUCOSE  108*     Recent Labs      03/28/18   1204   APTT  31.4   INR  1.04     Recent Labs      03/28/18   1204   BNPBTYPENAT  53         Lab Results   Component Value Date    TROPONINI 0.03 03/28/2018     Urinalysis:  No results found for: SPECGRAVITY, GLUCOSEUR, KETONES, NITRITE, WBCURINE, RBCURINE, BACTERIA, EPITHELCELL     Imaging  DX-CHEST-PORTABLE (1 VIEW)   Final Result      Mild lung base atelectasis/possible interstitial edema or pneumonitis.           Assessment/Plan     I anticipate this patient will require at least two midnights for appropriate medical management, necessitating inpatient admission.    * Influenza B- (present on admission)   Assessment & Plan    tamiflu ordered.        Hypoxia- (present on admission)   Assessment & Plan    Oxygen saturation was 86% on room air.        Leukopenia- (present on admission)   Assessment & Plan    WBC 2.5 on admit.  She does not have a history of low WBC  May be due to influenza  Repeat CBC in the morning        Tobacco abuse- (present on admission)   Assessment & Plan    Cessation has been discussed.  Nicotine replacement may be ordered for withdrawal symptoms.        Hypokalemia- (present on admission)   Assessment & Plan    K 3.5 on admit.  Hold hctz.        HTN (hypertension)- (present on admission)   Assessment & Plan    She had been on lisinopril-hctz 20-12.5 daily which will be changed to norvasc given low sodium          Hyponatremia- (present on admission)   Assessment & Plan    Sodium is 119 on admit.  May be due to pneumonia  Stop HCTZ  She has significant polydipsia for be restricted to a normal amount of water and will follow her levels daily.              VTE prophylaxis: lovenox.

## 2018-03-29 NOTE — CARE PLAN
Problem: Bronchoconstriction:  Goal: Improve in air movement and diminished wheezing  Outcome: PROGRESSING AS EXPECTED  Duoneb Q4    Problem: Oxygenation:  Goal: Maintain adequate oxygenation dependent on patient condition  Outcome: PROGRESSING AS EXPECTED  2 L NC

## 2018-03-29 NOTE — PROGRESS NOTES
Pt assessed, VSS, A & O x 4. Pt denies any CP, SOB, dizziness, or other pain. POC explained, pt verbalizes an understanding. Educated pt regarding falls and fall safety, pt verbalizes an understanding. Socks on, call light in place, bed lowered and locked, all comfort measures in place and needs met at this time.

## 2018-03-30 ENCOUNTER — APPOINTMENT (OUTPATIENT)
Dept: RADIOLOGY | Facility: MEDICAL CENTER | Age: 63
DRG: 193 | End: 2018-03-30
Attending: INTERNAL MEDICINE
Payer: COMMERCIAL

## 2018-03-30 LAB
ANION GAP SERPL CALC-SCNC: 6 MMOL/L (ref 0–11.9)
ANISOCYTOSIS BLD QL SMEAR: ABNORMAL
BASOPHILS # BLD AUTO: 0 % (ref 0–1.8)
BASOPHILS # BLD: 0 K/UL (ref 0–0.12)
BUN SERPL-MCNC: 8 MG/DL (ref 8–22)
CALCIUM SERPL-MCNC: 8.4 MG/DL (ref 8.5–10.5)
CHLORIDE SERPL-SCNC: 91 MMOL/L (ref 96–112)
CO2 SERPL-SCNC: 29 MMOL/L (ref 20–33)
CREAT SERPL-MCNC: 0.64 MG/DL (ref 0.5–1.4)
EOSINOPHIL # BLD AUTO: 0 K/UL (ref 0–0.51)
EOSINOPHIL NFR BLD: 0 % (ref 0–6.9)
ERYTHROCYTE [DISTWIDTH] IN BLOOD BY AUTOMATED COUNT: 42.8 FL (ref 35.9–50)
GLUCOSE SERPL-MCNC: 99 MG/DL (ref 65–99)
HCT VFR BLD AUTO: 46.9 % (ref 37–47)
HGB BLD-MCNC: 16.6 G/DL (ref 12–16)
LYMPHOCYTES # BLD AUTO: 0.89 K/UL (ref 1–4.8)
LYMPHOCYTES NFR BLD: 16.5 % (ref 22–41)
MANUAL DIFF BLD: NORMAL
MCH RBC QN AUTO: 31.3 PG (ref 27–33)
MCHC RBC AUTO-ENTMCNC: 35.4 G/DL (ref 33.6–35)
MCV RBC AUTO: 88.5 FL (ref 81.4–97.8)
MICROCYTES BLD QL SMEAR: ABNORMAL
MONOCYTES # BLD AUTO: 0.56 K/UL (ref 0–0.85)
MONOCYTES NFR BLD AUTO: 10.4 % (ref 0–13.4)
MORPHOLOGY BLD-IMP: NORMAL
NEUTROPHILS # BLD AUTO: 3.95 K/UL (ref 2–7.15)
NEUTROPHILS NFR BLD: 69.6 % (ref 44–72)
NEUTS BAND NFR BLD MANUAL: 3.5 % (ref 0–10)
NRBC # BLD AUTO: 0 K/UL
NRBC BLD-RTO: 0 /100 WBC
OSMOLALITY SERPL: 271 MOSM/KG H2O (ref 278–298)
PLATELET # BLD AUTO: 147 K/UL (ref 164–446)
PLATELET BLD QL SMEAR: NORMAL
PMV BLD AUTO: 8.7 FL (ref 9–12.9)
POTASSIUM SERPL-SCNC: 3.6 MMOL/L (ref 3.6–5.5)
RBC # BLD AUTO: 5.3 M/UL (ref 4.2–5.4)
RBC BLD AUTO: PRESENT
SODIUM SERPL-SCNC: 126 MMOL/L (ref 135–145)
TSH SERPL DL<=0.005 MIU/L-ACNC: 1.03 UIU/ML (ref 0.38–5.33)
WBC # BLD AUTO: 5.4 K/UL (ref 4.8–10.8)

## 2018-03-30 PROCEDURE — 700102 HCHG RX REV CODE 250 W/ 637 OVERRIDE(OP): Performed by: HOSPITALIST

## 2018-03-30 PROCEDURE — 83930 ASSAY OF BLOOD OSMOLALITY: CPT

## 2018-03-30 PROCEDURE — A9270 NON-COVERED ITEM OR SERVICE: HCPCS | Performed by: INTERNAL MEDICINE

## 2018-03-30 PROCEDURE — A9270 NON-COVERED ITEM OR SERVICE: HCPCS | Performed by: HOSPITALIST

## 2018-03-30 PROCEDURE — 85027 COMPLETE CBC AUTOMATED: CPT

## 2018-03-30 PROCEDURE — 36415 COLL VENOUS BLD VENIPUNCTURE: CPT

## 2018-03-30 PROCEDURE — 770020 HCHG ROOM/CARE - TELE (206)

## 2018-03-30 PROCEDURE — 700102 HCHG RX REV CODE 250 W/ 637 OVERRIDE(OP): Performed by: INTERNAL MEDICINE

## 2018-03-30 PROCEDURE — 85007 BL SMEAR W/DIFF WBC COUNT: CPT

## 2018-03-30 PROCEDURE — 99232 SBSQ HOSP IP/OBS MODERATE 35: CPT | Performed by: INTERNAL MEDICINE

## 2018-03-30 PROCEDURE — 80048 BASIC METABOLIC PNL TOTAL CA: CPT

## 2018-03-30 PROCEDURE — 84443 ASSAY THYROID STIM HORMONE: CPT

## 2018-03-30 PROCEDURE — 71046 X-RAY EXAM CHEST 2 VIEWS: CPT

## 2018-03-30 PROCEDURE — 700111 HCHG RX REV CODE 636 W/ 250 OVERRIDE (IP): Performed by: HOSPITALIST

## 2018-03-30 RX ORDER — GUAIFENESIN 600 MG/1
600 TABLET, EXTENDED RELEASE ORAL EVERY 12 HOURS
Status: DISCONTINUED | OUTPATIENT
Start: 2018-03-30 | End: 2018-03-31 | Stop reason: HOSPADM

## 2018-03-30 RX ORDER — IPRATROPIUM BROMIDE AND ALBUTEROL SULFATE 2.5; .5 MG/3ML; MG/3ML
3 SOLUTION RESPIRATORY (INHALATION) PRN
Status: DISCONTINUED | OUTPATIENT
Start: 2018-03-30 | End: 2018-03-31 | Stop reason: HOSPADM

## 2018-03-30 RX ADMIN — AMLODIPINE BESYLATE 5 MG: 5 TABLET ORAL at 09:58

## 2018-03-30 RX ADMIN — NICOTINE 21 MG: 21 PATCH, EXTENDED RELEASE TRANSDERMAL at 06:42

## 2018-03-30 RX ADMIN — OSELTAMIVIR PHOSPHATE 75 MG: 75 CAPSULE ORAL at 09:58

## 2018-03-30 RX ADMIN — GUAIFENESIN 600 MG: 600 TABLET, EXTENDED RELEASE ORAL at 22:36

## 2018-03-30 RX ADMIN — GUAIFENESIN 600 MG: 600 TABLET, EXTENDED RELEASE ORAL at 09:58

## 2018-03-30 RX ADMIN — LEVOFLOXACIN 750 MG: 750 TABLET, FILM COATED ORAL at 09:58

## 2018-03-30 RX ADMIN — PREDNISONE 40 MG: 20 TABLET ORAL at 09:58

## 2018-03-30 RX ADMIN — OSELTAMIVIR PHOSPHATE 75 MG: 75 CAPSULE ORAL at 22:36

## 2018-03-30 ASSESSMENT — ENCOUNTER SYMPTOMS
WHEEZING: 0
GASTROINTESTINAL NEGATIVE: 1
SPEECH CHANGE: 0
CHILLS: 1
FEVER: 0
DOUBLE VISION: 0
BLURRED VISION: 0
HEARTBURN: 0
MYALGIAS: 1
EYES NEGATIVE: 1
CARDIOVASCULAR NEGATIVE: 1
EYE PAIN: 0
PSYCHIATRIC NEGATIVE: 1
DEPRESSION: 0
PALPITATIONS: 0
SPUTUM PRODUCTION: 1
WEIGHT LOSS: 0
HEMOPTYSIS: 0
BRUISES/BLEEDS EASILY: 0
FLANK PAIN: 0
PHOTOPHOBIA: 0
ORTHOPNEA: 0
DIARRHEA: 0
DIZZINESS: 0
SHORTNESS OF BREATH: 0
CONSTIPATION: 0
TINGLING: 0
COUGH: 1

## 2018-03-30 ASSESSMENT — LIFESTYLE VARIABLES: SUBSTANCE_ABUSE: 0

## 2018-03-30 ASSESSMENT — PAIN SCALES - GENERAL
PAINLEVEL_OUTOF10: 0

## 2018-03-30 NOTE — PROGRESS NOTES
[facility] Hospitalist Progress Note    Date of Service: 3/29/2018    Chief Complaint  62 y.o. female is still smoking, admitted 3/28/2018 with shortness of breath, cough, positive influenzaB    Interval Problem Update  Patient feels somewhat better.  Denies fever.  Started on Tamiflu  Bandemia noted, started on levofloxacin (switched  from azithromycin)  Sodium improved. Potassium replaced   Wants to go home.        Consultants/Specialty  none    Disposition  Home when medically cleared        Review of Systems   Constitutional: Positive for chills and malaise/fatigue. Negative for fever and weight loss.   HENT: Negative.  Negative for ear pain, hearing loss and tinnitus.    Eyes: Negative.  Negative for blurred vision, double vision, photophobia and pain.   Respiratory: Positive for cough and sputum production. Negative for hemoptysis, shortness of breath and wheezing.    Cardiovascular: Negative.  Negative for chest pain, palpitations and orthopnea.   Gastrointestinal: Negative.  Negative for constipation, diarrhea and heartburn.   Genitourinary: Negative.  Negative for dysuria, flank pain and frequency.   Musculoskeletal: Positive for myalgias.   Skin: Negative for itching and rash.   Neurological: Negative for dizziness, tingling and speech change.   Endo/Heme/Allergies: Negative for environmental allergies. Does not bruise/bleed easily.   Psychiatric/Behavioral: Negative.  Negative for depression, substance abuse and suicidal ideas.   All other systems reviewed and are negative.     Physical Exam  Laboratory/Imaging   Hemodynamics  Temp (24hrs), Av.7 °C (98 °F), Min:36.3 °C (97.4 °F), Max:37 °C (98.6 °F)   Temperature: 36.3 °C (97.4 °F)  Pulse  Av.4  Min: 70  Max: 98    Blood Pressure: 117/68      Respiratory      Respiration: 18, Pulse Oximetry: 90 %, O2 Daily Delivery Respiratory : Silicone Nasal Cannula     Given By:: Mouthpiece, Work Of Breathing / Effort: Mild  RUL Breath Sounds: Clear, RML Breath  Sounds: Clear, RLL Breath Sounds: Diminished, KRIS Breath Sounds: Clear, LLL Breath Sounds: Diminished    Fluids    Intake/Output Summary (Last 24 hours) at 03/29/18 1744  Last data filed at 03/29/18 1500   Gross per 24 hour   Intake              920 ml   Output                0 ml   Net              920 ml       Nutrition  Orders Placed This Encounter   Procedures   • Diet Order     Standing Status:   Standing     Number of Occurrences:   1     Order Specific Question:   Diet:     Answer:   Regular [1]     Physical Exam   Constitutional: She is oriented to person, place, and time. She appears well-developed and well-nourished.   HENT:   Head: Normocephalic and atraumatic.   Eyes: EOM are normal. Pupils are equal, round, and reactive to light.   Neck: Normal range of motion. Neck supple.   Cardiovascular: Normal rate and regular rhythm.    Pulmonary/Chest: Effort normal. No respiratory distress. She has rhonchi.   Abdominal: Soft. Bowel sounds are normal. She exhibits no distension. There is no tenderness. There is no rebound and no guarding.   Musculoskeletal: Normal range of motion.   Neurological: She is alert and oriented to person, place, and time. No cranial nerve deficit.   Skin: Skin is warm and dry.   Psychiatric: She has a normal mood and affect.   Nursing note and vitals reviewed.      Recent Labs      03/28/18   1204   WBC  2.5*   RBC  5.34   HEMOGLOBIN  17.0*   HEMATOCRIT  47.0   MCV  88.0   MCH  31.8   MCHC  36.2*   RDW  41.9   PLATELETCT  158*   MPV  8.9*     Recent Labs      03/28/18   1204  03/29/18   0208   SODIUM  119*  124*   POTASSIUM  3.5*  3.5*   CHLORIDE  83*  89*   CO2  27  26   GLUCOSE  108*  201*   BUN  9  8   CREATININE  0.67  0.51   CALCIUM  9.0  8.4*     Recent Labs      03/28/18   1204   APTT  31.4   INR  1.04     Recent Labs      03/28/18   1204   BNPBTYPENAT  53              Assessment/Plan     * Influenza B- (present on admission)   Assessment & Plan    tamiflu ordered.         Hypoxia- (present on admission)   Assessment & Plan    Oxygen saturation was 86% on room air.  Probably needs home O2 evaluation.        Leukopenia- (present on admission)   Assessment & Plan    WBC 2.5 on admit. + bandemia  She does not have a history of low WBC  Treating  as for pneumonia        Acute respiratory failure with hypoxia (CMS-[facility])   Assessment & Plan    Secondary to pneumonia, influenza, and likely underlying COPD  Treatment :  antibiotics, Tamiflu, steroids, RT/bronchodilators  02 home evaluation        Pneumonia due to infectious organism   Assessment & Plan    -started empirically on  antibiotics  -RT treatment, bronchodilators  -Follow blood and sputum cultures          Tobacco abuse- (present on admission)   Assessment & Plan    Cessation has been discussed.  Nicotine replacement may be ordered for withdrawal symptoms.        Hypokalemia- (present on admission)   Assessment & Plan    K 3.5 on admit.  Hold hctz.  Replace. Monitor        HTN (hypertension)- (present on admission)   Assessment & Plan    She had been on lisinopril-hctz 20-12.5 daily which will be changed to norvasc given low sodium.  Continue to monitor          Hyponatremia- (present on admission)   Assessment & Plan    Improving  Stopped HCTZ on admission          Quality-Core Measures

## 2018-03-30 NOTE — ASSESSMENT & PLAN NOTE
Secondary to pneumonia, influenza, and likely underlying COPD  Treatment :  antibiotics, Tamiflu, steroids, RT/bronchodilators

## 2018-03-30 NOTE — ASSESSMENT & PLAN NOTE
-started empirically on  antibiotics  -RT treatment, bronchodilators  -Follow blood and sputum cultures

## 2018-03-30 NOTE — CARE PLAN
Problem: Knowledge Deficit  Goal: Knowledge of disease process/condition, treatment plan, diagnostic tests, and medications will improve    Intervention: Explain information regarding disease process/condition, treatment plan, diagnostic tests, and medications and document in education  Pt. Educated to hypoxia r/t disease process. Pt verbalized understanding.       Problem: Discharge Barriers/Planning  Goal: Patient's continuum of care needs will be met    Intervention: Involve patient and significant other/support system in setting and prioritizing goals for hospital stay and discharge  Discussed POC with pt r/t disease process and possible home oxygen. Pt verbalized understanding and disapproval for home oxygen.

## 2018-03-30 NOTE — PROGRESS NOTES
Report received by day shift RN. Pt laying in bed awake alert and oriented x 4 with no overt signs of dyspnea. Pt updated to POC and verbalized understanding. Bed locked in low position with fall precautions in place and call light in reach.

## 2018-03-31 VITALS
HEIGHT: 61 IN | HEART RATE: 79 BPM | OXYGEN SATURATION: 88 % | TEMPERATURE: 98.3 F | BODY MASS INDEX: 27.01 KG/M2 | SYSTOLIC BLOOD PRESSURE: 114 MMHG | DIASTOLIC BLOOD PRESSURE: 72 MMHG | WEIGHT: 143.08 LBS | RESPIRATION RATE: 20 BRPM

## 2018-03-31 PROCEDURE — A9270 NON-COVERED ITEM OR SERVICE: HCPCS | Performed by: HOSPITALIST

## 2018-03-31 PROCEDURE — 700102 HCHG RX REV CODE 250 W/ 637 OVERRIDE(OP): Performed by: HOSPITALIST

## 2018-03-31 PROCEDURE — 700111 HCHG RX REV CODE 636 W/ 250 OVERRIDE (IP): Performed by: HOSPITALIST

## 2018-03-31 PROCEDURE — A9270 NON-COVERED ITEM OR SERVICE: HCPCS | Performed by: INTERNAL MEDICINE

## 2018-03-31 PROCEDURE — 700102 HCHG RX REV CODE 250 W/ 637 OVERRIDE(OP): Performed by: INTERNAL MEDICINE

## 2018-03-31 PROCEDURE — 99239 HOSP IP/OBS DSCHRG MGMT >30: CPT | Performed by: INTERNAL MEDICINE

## 2018-03-31 RX ORDER — AMLODIPINE BESYLATE 10 MG/1
10 TABLET ORAL DAILY
Qty: 30 TAB | Refills: 0 | Status: SHIPPED | OUTPATIENT
Start: 2018-04-01 | End: 2018-04-13 | Stop reason: SDUPTHER

## 2018-03-31 RX ORDER — LEVOFLOXACIN 750 MG/1
750 TABLET, FILM COATED ORAL DAILY
Qty: 2 TAB | Refills: 0 | Status: SHIPPED | OUTPATIENT
Start: 2018-04-01 | End: 2018-04-03

## 2018-03-31 RX ORDER — AMLODIPINE BESYLATE 10 MG/1
10 TABLET ORAL
Status: DISCONTINUED | OUTPATIENT
Start: 2018-03-31 | End: 2018-03-31 | Stop reason: HOSPADM

## 2018-03-31 RX ORDER — OSELTAMIVIR PHOSPHATE 75 MG/1
75 CAPSULE ORAL EVERY 12 HOURS
Qty: 2 CAP | Refills: 0 | Status: SHIPPED | OUTPATIENT
Start: 2018-03-31 | End: 2018-04-01

## 2018-03-31 RX ORDER — GUAIFENESIN 600 MG/1
600 TABLET, EXTENDED RELEASE ORAL EVERY 12 HOURS
Qty: 14 TAB | Refills: 0 | Status: SHIPPED | OUTPATIENT
Start: 2018-03-31 | End: 2018-04-07

## 2018-03-31 RX ORDER — PREDNISONE 20 MG/1
20 TABLET ORAL DAILY
Qty: 2 TAB | Refills: 0 | Status: SHIPPED | OUTPATIENT
Start: 2018-03-31 | End: 2018-04-02

## 2018-03-31 RX ADMIN — OSELTAMIVIR PHOSPHATE 75 MG: 75 CAPSULE ORAL at 08:31

## 2018-03-31 RX ADMIN — GUAIFENESIN 600 MG: 600 TABLET, EXTENDED RELEASE ORAL at 08:31

## 2018-03-31 RX ADMIN — NICOTINE 21 MG: 21 PATCH, EXTENDED RELEASE TRANSDERMAL at 06:00

## 2018-03-31 RX ADMIN — PREDNISONE 40 MG: 20 TABLET ORAL at 08:31

## 2018-03-31 RX ADMIN — AMLODIPINE BESYLATE 10 MG: 10 TABLET ORAL at 08:31

## 2018-03-31 RX ADMIN — LEVOFLOXACIN 750 MG: 750 TABLET, FILM COATED ORAL at 08:31

## 2018-03-31 ASSESSMENT — PAIN SCALES - GENERAL
PAINLEVEL_OUTOF10: 0
PAINLEVEL_OUTOF10: 0

## 2018-03-31 NOTE — FACE TO FACE
Face to Face Note  -  Durable Medical Equipment    Hernan Leal M.D. - NPI: [id number]  I certify that this patient is under my care and that they had a durable medical equipment(DME)face to face encounter by myself that meets the physician DME face-to-face encounter requirements with this patient on:    Date of encounter:   Patient:                    MRN:                       YOB: 2017  Luc Corrales  [id number]  1955     The encounter with the patient was in whole, or in part, for the following medical condition, which is the primary reason for durable medical equipment:  Other - Pneumonia    I certify that, based on my findings, the following durable medical equipment is medically necessary:  Oxygen.    HOME O2 Saturation Measurements:(Values must be present for Home Oxygen orders)  Room air sat at rest: 88  Room air sat with amb: 88  With liters of O2: 0.5, O2 sat at rest with O2: 93  With Liters of O2: 0.5, O2 sat with amb with O2 : 91  Is the patient mobile?: Yes    My Clinical findings support the need for the above equipment due to:  Hypoxia    Supporting Symptoms: dyspnea

## 2018-03-31 NOTE — DISCHARGE SUMMARY
CHIEF COMPLAINT ON ADMISSION  Chief Complaint   Patient presents with   • Shortness of Breath   • Cough   • Chest Wall Pain       CODE STATUS  Full Code    HPI & HOSPITAL COURSE  This is a 62 y.o. female with history of hypertension and smoking tobacco, here shortness of breath, cough, diagnosed with with influenza B and pneumonia. For details see H&P note.  Patient was treated with Tamiflu for influenza B. Was treated  WITH antibiotic,steroids and bronchodilators for pneumonia. Counselling For tobacco cessation provided.  Hypokalemia replaced. Due to low sodium, her medications for blood pressure (lisinopril/hydrochlorothiazide) changed to amlodipine. Upon discharge, continue antibiotics and follow-up with PCP. Initially hypoxic, she improved. Patient did no qualify for home 02 upon discharge (Sp02 did not go below 88% on exertion)  The patient met 2-midnight criteria for an inpatient stay at the time of discharge.    Therefore, she is discharged in fair and stable condition with close outpatient follow-up.    SPECIFIC OUTPATIENT FOLLOW-UP  pcp    DISCHARGE PROBLEM LIST  Principal Problem:    Influenza B POA: Yes  Active Problems:    Leukopenia POA: Yes    Hypoxia POA: Yes    Hyponatremia POA: Yes    HTN (hypertension) POA: Yes    Hypokalemia POA: Yes    Tobacco abuse POA: Yes    Pneumonia due to infectious organism POA: Unknown    Acute respiratory failure with hypoxia (CMS-HCC) POA: Unknown  Resolved Problems:    * No resolved hospital problems. *      FOLLOW UP  Future Appointments  Date Time Provider Department [place]   4/2/2018 11:20 AM CARE MANAGER TK BONILLA   4/4/2018 11:40 AM VIDHYA Ross   4/13/2018 2:00 PM Isadora Arroyo M.D. [facility] None     Primary Care Provider    Schedule an appointment as soon as possible for a visit in 1 week  Hospital follow-up appointment with PCP      MEDICATIONS ON DISCHARGE   Luc Corrales   Home Medication Instructions DAVID:[id number]    Printed  on:03/31/18 1643   Medication Information                      amLODIPine (NORVASC) 10 MG Tab  Take 1 Tab by mouth every day.             guaiFENesin LA (MUCINEX) 600 MG TABLET SR 12 HR  Take 1 Tab by mouth every 12 hours for 7 days.             ibuprofen (MOTRIN) 200 MG Tab  Take 200 mg by mouth every 6 hours as needed for Mild Pain.             levoFLOXacin (LEVAQUIN) 750 MG tablet  Take 1 Tab by mouth every day for 2 days.             lisinopril-hydrochlorothiazide (PRINZIDE, ZESTORETIC) 20-12.5 MG per tablet  Take 1 Tab by mouth every day.             oseltamivir (TAMIFLU) 75 MG Cap  Take 1 Cap by mouth every 12 hours for 1 day.             predniSONE (DELTASONE) 20 MG Tab  Take 1 Tab by mouth every day for 2 days.                 DIET  Orders Placed This Encounter   Procedures   • Diet Order     Standing Status:   Standing     Number of Occurrences:   1     Order Specific Question:   Diet:     Answer:   Regular [1]       ACTIVITY  As tolerated.      CONSULTATIONS  none    PROCEDURES  none    LABORATORY  Lab Results   Component Value Date/Time    SODIUM 126 (L) 03/30/2018 09:53 AM    POTASSIUM 3.6 03/30/2018 09:53 AM    CHLORIDE 91 (L) 03/30/2018 09:53 AM    CO2 29 03/30/2018 09:53 AM    GLUCOSE 99 03/30/2018 09:53 AM    BUN 8 03/30/2018 09:53 AM    CREATININE 0.64 03/30/2018 09:53 AM        Lab Results   Component Value Date/Time    WBC 5.4 03/30/2018 09:53 AM    HEMOGLOBIN 16.6 (H) 03/30/2018 09:53 AM    HEMATOCRIT 46.9 03/30/2018 09:53 AM    PLATELETCT 147 (L) 03/30/2018 09:53 AM      DX-CHEST-2 VIEWS   Final Result         Patchy airspace opacities in the bilateral lower lobes could relate to developing pneumonia or worsening atelectasis.      DX-CHEST-PORTABLE (1 VIEW)   Final Result      Mild lung base atelectasis/possible interstitial edema or pneumonitis.            Total time of the discharge process exceeds 45 minutes

## 2018-03-31 NOTE — CARE PLAN
Problem: Discharge Barriers/Planning  Goal: Patient's continuum of care needs will be met    Intervention: Involve patient and significant other/support system in setting and prioritizing goals for hospital stay and discharge  Pt given the indication to ABX r/t pneumonia. Pt verbalized understanding.       Problem: Respiratory:  Goal: Respiratory status will improve    Intervention: Administer and titrate oxygen therapy  Pt on 1.5L NC    Intervention: Educate and encourage coughing and deep breathing  Pt has strong non productive cough.  Intervention: Educate and encourage incentive spirometry usage  Pt has weak effort at 750

## 2018-03-31 NOTE — PROGRESS NOTES
[facility] Hospitalist Progress Note    Date of Service: 3/30/2018    Chief Complaint  62 y.o. female is still smoking, admitted 3/28/2018 with shortness of breath, cough, positive influenzaB    Interval Problem Update  Patient doesn't feel better comparing to yesterday.  Denies fever. Chest x-ray showed worsening of bibasilar patchy opacities. Off oxygen. Incentive spirometry ordered  CBC without evidence of leukocytosis      Consultants/Specialty  none    Disposition  Home when medically cleared        Review of Systems   Constitutional: Positive for chills and malaise/fatigue. Negative for fever and weight loss.   HENT: Negative.  Negative for ear pain, hearing loss and tinnitus.    Eyes: Negative.  Negative for blurred vision, double vision, photophobia and pain.   Respiratory: Positive for cough and sputum production. Negative for hemoptysis, shortness of breath and wheezing.    Cardiovascular: Negative.  Negative for chest pain, palpitations and orthopnea.   Gastrointestinal: Negative.  Negative for constipation, diarrhea and heartburn.   Genitourinary: Negative.  Negative for dysuria, flank pain and frequency.   Musculoskeletal: Positive for myalgias.   Skin: Negative for itching and rash.   Neurological: Negative for dizziness, tingling and speech change.   Endo/Heme/Allergies: Negative for environmental allergies. Does not bruise/bleed easily.   Psychiatric/Behavioral: Negative.  Negative for depression, substance abuse and suicidal ideas.   All other systems reviewed and are negative.     Physical Exam  Laboratory/Imaging   Hemodynamics  Temp (24hrs), Av.9 °C (98.4 °F), Min:36.7 °C (98 °F), Max:37.1 °C (98.7 °F)   Temperature: 36.8 °C (98.3 °F)  Pulse  Av.6  Min: 69  Max: 98    Blood Pressure: 116/69      Respiratory      Respiration: (!) 24, Pulse Oximetry: 91 %, O2 Daily Delivery Respiratory : Silicone Nasal Cannula        RUL Breath Sounds: Clear, RML Breath Sounds: Diminished, RLL Breath Sounds:  Diminished, KRIS Breath Sounds: Clear, LLL Breath Sounds: Diminished    Fluids    Intake/Output Summary (Last 24 hours) at 03/30/18 1727  Last data filed at 03/29/18 2140   Gross per 24 hour   Intake              240 ml   Output                0 ml   Net              240 ml       Nutrition  Orders Placed This Encounter   Procedures   • Diet Order     Standing Status:   Standing     Number of Occurrences:   1     Order Specific Question:   Diet:     Answer:   Regular [1]     Physical Exam   Constitutional: She is oriented to person, place, and time. She appears well-developed and well-nourished.   HENT:   Head: Normocephalic and atraumatic.   Eyes: EOM are normal. Pupils are equal, round, and reactive to light.   Neck: Normal range of motion. Neck supple.   Cardiovascular: Normal rate and regular rhythm.    Pulmonary/Chest: Effort normal. No respiratory distress. She has rhonchi.   Abdominal: Soft. Bowel sounds are normal. She exhibits no distension. There is no tenderness. There is no rebound and no guarding.   Musculoskeletal: Normal range of motion.   Neurological: She is alert and oriented to person, place, and time. No cranial nerve deficit.   Skin: Skin is warm and dry.   Psychiatric: She has a normal mood and affect.   Nursing note and vitals reviewed.      Recent Labs      03/28/18   1204  03/30/18   0953   WBC  2.5*  5.4   RBC  5.34  5.30   HEMOGLOBIN  17.0*  16.6*   HEMATOCRIT  47.0  46.9   MCV  88.0  88.5   MCH  31.8  31.3   MCHC  36.2*  35.4*   RDW  41.9  42.8   PLATELETCT  158*  147*   MPV  8.9*  8.7*     Recent Labs      03/28/18   1204  03/29/18   0208  03/30/18   0953   SODIUM  119*  124*  126*   POTASSIUM  3.5*  3.5*  3.6   CHLORIDE  83*  89*  91*   CO2  27  26  29   GLUCOSE  108*  201*  99   BUN  9  8  8   CREATININE  0.67  0.51  0.64   CALCIUM  9.0  8.4*  8.4*     Recent Labs      03/28/18   1204   APTT  31.4   INR  1.04     Recent Labs      03/28/18   1204   BNPBTYPENAT  53               Assessment/Plan     * Influenza B- (present on admission)   Assessment & Plan    tamiflu ordered.        Hypoxia- (present on admission)   Assessment & Plan    Oxygen saturation was 86% on room air.  Improving. Hopefully will be discharged without oxygen next 1-2 days        Leukopenia- (present on admission)   Assessment & Plan    WBC 2.5 on admit. + bandemia  She does not have a history of low WBC  Treating  as for pneumonia        Acute respiratory failure with hypoxia (CMS-[facility])   Assessment & Plan    Secondary to pneumonia, influenza, and likely underlying COPD  Treatment :  antibiotics, Tamiflu, steroids, RT/bronchodilators          Pneumonia due to infectious organism   Assessment & Plan    -started empirically on  antibiotics  -RT treatment, bronchodilators  -Follow blood and sputum cultures          Tobacco abuse- (present on admission)   Assessment & Plan    Cessation has been discussed.  Nicotine replacement may be ordered for withdrawal symptoms.        Hypokalemia- (present on admission)   Assessment & Plan    K 3.5 on admit.  Hold hctz.  Replace. Monitor        HTN (hypertension)- (present on admission)   Assessment & Plan    She had been on lisinopril-hctz 20-12.5 daily which will be changed to norvasc given low sodium.  Continue to monitor          Hyponatremia- (present on admission)   Assessment & Plan    Improving  Stopped HCTZ on admission          Quality-Core Measures

## 2018-03-31 NOTE — CARE PLAN
Problem: Safety  Goal: Will remain free from injury  Outcome: PROGRESSING AS EXPECTED  Fall precautions in place. Bed in lowest position. Non-skid socks in place. Personal possessions within reach. Mobility sign on door. Bed-alarm on. Call light within reach. Pt educated regarding fall prevention and states understanding.     Problem: Knowledge Deficit  Goal: Knowledge of the prescribed therapeutic regimen will improve  Outcome: PROGRESSING AS EXPECTED  Pt educated regarding plan of care and medications. All questions answered.

## 2018-03-31 NOTE — PROGRESS NOTES
Assumed care at 0700. Bedside report received from Reinaldo. Patient's chart and MAR reviewed. Pt denies pain at this time. Pt is A & O 4, on 1.5L nasal cannula O2 (hot at home). Patient was updated on plan of care for the day. Questions answered and concerns addressed.  Pt denies any additional needs at this time. White board updated. Call light, phone and personal belongings within reach.

## 2018-03-31 NOTE — DISCHARGE INSTRUCTIONS
Discharge Instructions    Discharged to home by car with friend. Discharged via wheelchair, hospital escort: Yes.  Special equipment needed: Not Applicable    Be sure to schedule a follow-up appointment with your primary care doctor or any specialists as instructed.     Discharge Plan:   Diet Plan: Discussed  Activity Level: Discussed  Smoking Cessation Offered: Patient Counseled  Confirmed Follow up Appointment: Appointment Scheduled  Confirmed Symptoms Management: Discussed  Medication Reconciliation Updated: Yes  Influenza Vaccine Indication: Patient Refuses    I understand that a diet low in cholesterol, fat, and sodium is recommended for good health. Unless I have been given specific instructions below for another diet, I accept this instruction as my diet prescription.   Other diet:     Special Instructions: None    · Is patient discharged on Warfarin / Coumadin?   No       Community-Acquired Pneumonia, Adult  Introduction  Pneumonia is an infection of the lungs. One type of pneumonia can happen while a person is in a hospital. A different type can happen when a person is not in a hospital (community-acquired pneumonia). It is easy for this kind to spread from person to person. It can spread to you if you breathe near an infected person who coughs or sneezes. Some symptoms include:  · A dry cough.  · A wet (productive) cough.  · Fever.  · Sweating.  · Chest pain.  Follow these instructions at home:  · Take over-the-counter and prescription medicines only as told by your doctor.  ¨ Only take cough medicine if you are losing sleep.  ¨ If you were prescribed an antibiotic medicine, take it as told by your doctor. Do not stop taking the antibiotic even if you start to feel better.  · Sleep with your head and neck raised (elevated). You can do this by putting a few pillows under your head, or you can sleep in a recliner.  · Do not use tobacco products. These include cigarettes, chewing tobacco, and e-cigarettes. If  you need help quitting, ask your doctor.  · Drink enough water to keep your pee (urine) clear or pale yellow.  A shot (vaccine) can help prevent pneumonia. Shots are often suggested for:  · People older than 65 years of age.  · People older than 19 years of age:  ¨ Who are having cancer treatment.  ¨ Who have long-term (chronic) lung disease.  ¨ Who have problems with their body's defense system (immune system).  You may also prevent pneumonia if you take these actions:  · Get the flu (influenza) shot every year.  · Go to the dentist as often as told.  · Wash your hands often. If soap and water are not available, use hand .  Contact a doctor if:  · You have a fever.  · You lose sleep because your cough medicine does not help.  Get help right away if:  · You are short of breath and it gets worse.  · You have more chest pain.  · Your sickness gets worse. This is very serious if:  ¨ You are an older adult.  ¨ Your body's defense system is weak.  · You cough up blood.  This information is not intended to replace advice given to you by your health care provider. Make sure you discuss any questions you have with your health care provider.  Document Released: 06/05/2009 Document Revised: 05/25/2017 Document Reviewed: 04/13/2016  © 2017 Elsevier      Steps to Quit Smoking  Smoking tobacco can be bad for your health. It can also affect almost every organ in your body. Smoking puts you and people around you at risk for many serious long-lasting (chronic) diseases. Quitting smoking is hard, but it is one of the best things that you can do for your health. It is never too late to quit.  What are the benefits of quitting smoking?  When you quit smoking, you lower your risk for getting serious diseases and conditions. They can include:  · Lung cancer or lung disease.  · Heart disease.  · Stroke.  · Heart attack.  · Not being able to have children (infertility).  · Weak bones (osteoporosis) and broken bones  (fractures).  If you have coughing, wheezing, and shortness of breath, those symptoms may get better when you quit. You may also get sick less often. If you are pregnant, quitting smoking can help to lower your chances of having a baby of low birth weight.  What can I do to help me quit smoking?  Talk with your doctor about what can help you quit smoking. Some things you can do (strategies) include:  · Quitting smoking totally, instead of slowly cutting back how much you smoke over a period of time.  · Going to in-person counseling. You are more likely to quit if you go to many counseling sessions.  · Using resources and support systems, such as:  ¨ Online chats with a counselor.  ¨ Phone quitlines.  ¨ Printed self-help materials.  ¨ Support groups or group counseling.  ¨ Text messaging programs.  ¨ Mobile phone apps or applications.  · Taking medicines. Some of these medicines may have nicotine in them. If you are pregnant or breastfeeding, do not take any medicines to quit smoking unless your doctor says it is okay. Talk with your doctor about counseling or other things that can help you.  Talk with your doctor about using more than one strategy at the same time, such as taking medicines while you are also going to in-person counseling. This can help make quitting easier.  What things can I do to make it easier to quit?  Quitting smoking might feel very hard at first, but there is a lot that you can do to make it easier. Take these steps:  · Talk to your family and friends. Ask them to support and encourage you.  · Call phone quitlines, reach out to support groups, or work with a counselor.  · Ask people who smoke to not smoke around you.  · Avoid places that make you want (trigger) to smoke, such as:  ¨ Bars.  ¨ Parties.  ¨ Smoke-break areas at work.  · Spend time with people who do not smoke.  · Lower the stress in your life. Stress can make you want to smoke. Try these things to help your stress:  ¨ Getting  regular exercise.  ¨ Deep-breathing exercises.  ¨ Yoga.  ¨ Meditating.  ¨ Doing a body scan. To do this, close your eyes, focus on one area of your body at a time from head to toe, and notice which parts of your body are tense. Try to relax the muscles in those areas.  · Download or buy apps on your mobile phone or tablet that can help you stick to your quit plan. There are many free apps, such as QuitGuide from the CDC (Centers for Disease Control and Prevention). You can find more support from smokefree.gov and other websites.  This information is not intended to replace advice given to you by your health care provider. Make sure you discuss any questions you have with your health care provider.  Document Released: 10/14/2010 Document Revised: 08/15/2017 Document Reviewed: 05/03/2016  [employer] Interactive Patient Education © 2017 [employer]    Depression / Suicide Risk    As you are discharged from this [facility] facility, it is important to learn how to keep safe from harming yourself.    Recognize the warning signs:  · Abrupt changes in personality, positive or negative- including increase in energy   · Giving away possessions  · Change in eating patterns- significant weight changes-  positive or negative  · Change in sleeping patterns- unable to sleep or sleeping all the time   · Unwillingness or inability to communicate  · Depression  · Unusual sadness, discouragement and loneliness  · Talk of wanting to die  · Neglect of personal appearance   · Rebelliousness- reckless behavior  · Withdrawal from people/activities they love  · Confusion- inability to concentrate     If you or a loved one observes any of these behaviors or has concerns about self-harm, here's what you can do:  · Talk about it- your feelings and reasons for harming yourself  · Remove any means that you might use to hurt yourself (examples: pills, rope, extension cords, firearm)  · Get professional help from the community (Mental Health,  Substance Abuse, psychological counseling)  · Do not be alone:Call your Safe Contact- someone whom you trust who will be there for you.  · Call your local CRISIS HOTLINE 573-6382 or 096-471-9172  · Call your local Children's Mobile Crisis Response Team [place] (286) 953-9301 or www.Feedgen  · Call the toll free National Suicide Prevention Hotlines   · National Suicide Prevention Lifeline 020-466-YQHB (4579)  · National Hope Line Network 800-SUICIDE (007-8781)

## 2018-03-31 NOTE — PROGRESS NOTES
Pt assessed, VSS, A & O x 4. Pt denies any CP,  dizziness, or other pain. SOB on exertion. POC explained, pt verbalizes an understanding. Educated pt regarding falls and fall safety, pt verbalizes an understanding. Socks on, call light in place, bed lowered and locked, all comfort measures in place and needs met at this time.

## 2018-03-31 NOTE — PROGRESS NOTES
Report received by day shift RN. Pt laying in bed awake alert and oriented x 4 with no c/o pain or dyspnea. Bed locked in low position with fall precautions in place and call light in reach.

## 2018-04-01 NOTE — PROGRESS NOTES
Pt dc'd 1530. IV and monitor removed; monitor room notified. Pt left unit via wheelchair with RN. Personal belongings with pt when leaving unit. Pt given discharge instructions prior to leaving unit including prescription and when to visit with physician; verbalizes understanding. Copy of discharge instructions with pt and in the chart.

## 2018-04-02 ENCOUNTER — PATIENT OUTREACH (OUTPATIENT)
Dept: HEALTH INFORMATION MANAGEMENT | Facility: OTHER | Age: 63
End: 2018-04-02

## 2018-04-02 LAB
BACTERIA BLD CULT: NORMAL
BACTERIA BLD CULT: NORMAL
SIGNIFICANT IND 70042: NORMAL
SIGNIFICANT IND 70042: NORMAL
SITE SITE: NORMAL
SITE SITE: NORMAL
SOURCE SOURCE: NORMAL
SOURCE SOURCE: NORMAL

## 2018-04-13 ENCOUNTER — OFFICE VISIT (OUTPATIENT)
Dept: MEDICAL GROUP | Facility: MEDICAL CENTER | Age: 63
End: 2018-04-13
Payer: COMMERCIAL

## 2018-04-13 VITALS
TEMPERATURE: 99.3 F | BODY MASS INDEX: 25.01 KG/M2 | RESPIRATION RATE: 16 BRPM | DIASTOLIC BLOOD PRESSURE: 64 MMHG | HEART RATE: 84 BPM | WEIGHT: 135.91 LBS | OXYGEN SATURATION: 94 % | SYSTOLIC BLOOD PRESSURE: 132 MMHG | HEIGHT: 62 IN

## 2018-04-13 DIAGNOSIS — J45.909 REACTIVE AIRWAY DISEASE WITHOUT COMPLICATION, UNSPECIFIED ASTHMA SEVERITY, UNSPECIFIED WHETHER PERSISTENT: ICD-10-CM

## 2018-04-13 DIAGNOSIS — J18.9 PNEUMONIA DUE TO INFECTIOUS ORGANISM, UNSPECIFIED LATERALITY, UNSPECIFIED PART OF LUNG: ICD-10-CM

## 2018-04-13 DIAGNOSIS — Z12.11 SCREENING FOR COLON CANCER: ICD-10-CM

## 2018-04-13 DIAGNOSIS — G47.00 INSOMNIA, UNSPECIFIED TYPE: ICD-10-CM

## 2018-04-13 DIAGNOSIS — I10 ESSENTIAL HYPERTENSION: ICD-10-CM

## 2018-04-13 DIAGNOSIS — Z72.0 TOBACCO ABUSE: ICD-10-CM

## 2018-04-13 PROBLEM — J10.1 INFLUENZA B: Status: RESOLVED | Noted: 2018-03-28 | Resolved: 2018-04-13

## 2018-04-13 PROBLEM — E87.1 HYPONATREMIA: Status: RESOLVED | Noted: 2018-03-28 | Resolved: 2018-04-13

## 2018-04-13 PROBLEM — D72.819 LEUKOPENIA: Status: RESOLVED | Noted: 2018-03-28 | Resolved: 2018-04-13

## 2018-04-13 PROBLEM — E87.6 HYPOKALEMIA: Status: RESOLVED | Noted: 2018-03-28 | Resolved: 2018-04-13

## 2018-04-13 PROCEDURE — 99214 OFFICE O/P EST MOD 30 MIN: CPT | Performed by: FAMILY MEDICINE

## 2018-04-13 RX ORDER — CODEINE PHOSPHATE AND GUAIFENESIN 10; 100 MG/5ML; MG/5ML
SOLUTION ORAL
Qty: 1 BOTTLE | Refills: 0 | Status: SHIPPED | OUTPATIENT
Start: 2018-04-13 | End: 2018-05-13

## 2018-04-13 RX ORDER — ALBUTEROL SULFATE 90 UG/1
2 AEROSOL, METERED RESPIRATORY (INHALATION) EVERY 6 HOURS PRN
Qty: 1 INHALER | Refills: 3 | Status: SHIPPED | OUTPATIENT
Start: 2018-04-13 | End: 2018-04-13 | Stop reason: SDUPTHER

## 2018-04-13 RX ORDER — AMLODIPINE BESYLATE 10 MG/1
10 TABLET ORAL DAILY
Qty: 90 TAB | Refills: 3 | Status: SHIPPED | OUTPATIENT
Start: 2018-04-13 | End: 2018-07-13

## 2018-04-13 RX ORDER — ALBUTEROL SULFATE 90 UG/1
2 AEROSOL, METERED RESPIRATORY (INHALATION) EVERY 6 HOURS PRN
Qty: 1 INHALER | Refills: 3 | Status: SHIPPED | OUTPATIENT
Start: 2018-04-13 | End: 2020-11-03

## 2018-04-13 RX ORDER — TRAZODONE HYDROCHLORIDE 50 MG/1
TABLET ORAL
Qty: 60 TAB | Refills: 3 | Status: SHIPPED | OUTPATIENT
Start: 2018-04-13 | End: 2018-07-13

## 2018-04-13 NOTE — ASSESSMENT & PLAN NOTE
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

## 2018-04-13 NOTE — ASSESSMENT & PLAN NOTE
Doing well on room air  Still tired and coughing but no fevers no pleurisy overall improving   We should consider 6-8 weeks post pneumonia follow up cxr however she is not interested in follow up studies

## 2018-04-13 NOTE — PROGRESS NOTES
"This medical record contains text that has been entered with the assistance of computer voice recognition and dictation software.  Therefore, it may contain unintended errors in text, spelling, punctuation, or grammar        Chief Complaint   Patient presents with   • Establish Care       Luc Corrales is a 62 y.o. female here evaluation and management of: post hospital hypoxia follow up and htn and insomnia (new problem)       HPI:     \"HPI & HOSPITAL COURSE  This is a 62 y.o. female with history of hypertension and smoking tobacco, here shortness of breath, cough, diagnosed with with influenza B and pneumonia. For details see H&P note.  Patient was treated with Tamiflu for influenza B. Was treated  WITH antibiotic,steroids and bronchodilators for pneumonia. Counselling For tobacco cessation provided.  Hypokalemia replaced. Due to low sodium, her medications for blood pressure (lisinopril/hydrochlorothiazide) changed to amlodipine. Upon discharge, continue antibiotics and follow-up with PCP. Initially hypoxic, she improved. Patient did no qualify for home 02 upon discharge (Sp02 did not go below 88% on exertion)  The patient met 2-midnight criteria for an inpatient stay at the time of discharge.     Therefore, she is discharged in fair and stable condition with close outpatient follow-up.\"      She is doing well she is stating that her bp is controlled on her new CCB  She is feeling well from a respiratory standpoint continues to have dry cough no pleuricy no sob  + fatigue  No chest pain   No sob    She wants to discuss new problem of insomnia        Current Outpatient Prescriptions   Medication Sig Dispense Refill   • amLODIPine (NORVASC) 10 MG Tab Take 1 Tab by mouth every day. 90 Tab 3   • guaifenesin-codeine (CHERATUSSIN AC) Solution oral solution Take 5-10 ML nightly as needed for cough 1 Bottle 0   • traZODone (DESYREL) 50 MG Tab Take 1-4 tablets nightly as needed for sleep 60 Tab 3   • beclomethasone " "(QVAR) 80 MCG/ACT inhaler Inhale 2 Puffs by mouth 2 times a day. 7.3 g 11   • albuterol 108 (90 Base) MCG/ACT Aero Soln inhalation aerosol Inhale 2 Puffs by mouth every 6 hours as needed for Shortness of Breath. 1 Inhaler 3   • ibuprofen (MOTRIN) 200 MG Tab Take 200 mg by mouth every 6 hours as needed for Mild Pain.       No current facility-administered medications for this visit.      Patient Active Problem List    Diagnosis Date Noted   • Hypoxia 03/28/2018     Priority: Medium   • Reactive airway disease without complication 04/13/2018   • Insomnia 04/13/2018   • Pneumonia due to infectious organism 03/29/2018   • Acute respiratory failure with hypoxia (CMS-HCC) 03/29/2018   • HTN (hypertension) 03/28/2018   • Tobacco abuse 03/28/2018     Past Surgical History:   Procedure Laterality Date   • ABDOMINAL EXPLORATION     • APPENDECTOMY     • PRIMARY C SECTION        Social History   Substance Use Topics   • Smoking status: Former Smoker     Packs/day: 2.50     Years: 40.00     Types: Cigarettes     Quit date: 3/28/2018   • Smokeless tobacco: Never Used   • Alcohol use Yes     Family History   Problem Relation Age of Onset   • Hypertension Mother    • Hyperlipidemia Mother    • Hypertension Father    • Heart Disease Father    • Diabetes Father    • Diabetes Brother    • Hypertension Brother    • Hyperlipidemia Brother            ROS    all review of system completed and negative except for those listed above     Objective:     Blood pressure 132/64, pulse 84, temperature 37.4 °C (99.3 °F), resp. rate 16, height 1.562 m (5' 1.5\"), weight 61.7 kg (135 lb 14.6 oz), SpO2 94 %. Body mass index is 25.26 kg/m².  Physical Exam:    Constitutional: Alert, no distress.  Skin: Warm, dry, good turgor, no rashes in visible areas.  Eye: Equal, round and reactive, conjunctiva clear, lids normal.  ENMT: Lips without lesions, good dentition, oropharynx clear.  Neck: Trachea midline, no masses, no thyromegaly. No cervical or " supraclavicular lymphadenopathy.  Respiratory: Unlabored respiratory effort, lungs clear to auscultation, no wheezes, no ronchi.  Cardiovascular: Normal S1, S2, no murmur, no edema.  Abdomen: Soft, non-tender, no masses, no hepatosplenomegaly.  Psych: Alert and oriented x3, normal affect and mood.              Assessment and Plan:   The following treatment plan was discussed        Problem List Items Addressed This Visit     HTN (hypertension)     Well controlled on norvasc  Labs and appointments q6 mo              Relevant Medications    amLODIPine (NORVASC) 10 MG Tab    Other Relevant Orders    BASIC METABOLIC PANEL    LDL, DIRECT    HDL CHOLESTEROL    CHOLESTEROL    Tobacco abuse     30 pack year smoking history but quit x 1 mo after hospitalization               Pneumonia due to infectious organism     Doing well on room air  Still tired and coughing but no fevers no pleurisy overall improving   We should consider 6-8 weeks post pneumonia follow up cxr however she is not interested in follow up studies                        Relevant Medications    guaifenesin-codeine (CHERATUSSIN AC) Solution oral solution    beclomethasone (QVAR) 80 MCG/ACT inhaler    albuterol 108 (90 Base) MCG/ACT Aero Soln inhalation aerosol    Reactive airway disease without complication     She has 30 pack year smoking history   She is not interested in lung cancer screening nor is she interested in PFTs  But she is amenable to trial of controller inh             Relevant Medications    guaifenesin-codeine (CHERATUSSIN AC) Solution oral solution    Insomnia     She is not interested 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

## 2018-04-13 NOTE — ASSESSMENT & PLAN NOTE
She has 30 pack year smoking history   She is not interested in lung cancer screening nor is she interested in PFTs  But she is amenable to trial of controller inh

## 2018-05-17 ENCOUNTER — APPOINTMENT (OUTPATIENT)
Dept: MEDICAL GROUP | Facility: MEDICAL CENTER | Age: 63
End: 2018-05-17
Payer: COMMERCIAL

## 2018-07-13 ENCOUNTER — OFFICE VISIT (OUTPATIENT)
Dept: MEDICAL GROUP | Facility: MEDICAL CENTER | Age: 63
End: 2018-07-13
Payer: COMMERCIAL

## 2018-07-13 VITALS
WEIGHT: 147.71 LBS | BODY MASS INDEX: 27.18 KG/M2 | HEART RATE: 84 BPM | HEIGHT: 62 IN | RESPIRATION RATE: 16 BRPM | DIASTOLIC BLOOD PRESSURE: 92 MMHG | SYSTOLIC BLOOD PRESSURE: 138 MMHG | OXYGEN SATURATION: 95 % | TEMPERATURE: 98.8 F

## 2018-07-13 DIAGNOSIS — I10 ESSENTIAL HYPERTENSION: ICD-10-CM

## 2018-07-13 DIAGNOSIS — Z87.891 HISTORY OF TOBACCO ABUSE: ICD-10-CM

## 2018-07-13 DIAGNOSIS — J45.909 REACTIVE AIRWAY DISEASE WITHOUT COMPLICATION, UNSPECIFIED ASTHMA SEVERITY, UNSPECIFIED WHETHER PERSISTENT: ICD-10-CM

## 2018-07-13 DIAGNOSIS — G47.00 INSOMNIA, UNSPECIFIED TYPE: ICD-10-CM

## 2018-07-13 PROBLEM — J18.9 PNEUMONIA DUE TO INFECTIOUS ORGANISM: Status: RESOLVED | Noted: 2018-03-29 | Resolved: 2018-07-13

## 2018-07-13 PROBLEM — R09.02 HYPOXIA: Status: RESOLVED | Noted: 2018-03-28 | Resolved: 2018-07-13

## 2018-07-13 PROBLEM — J96.01 ACUTE RESPIRATORY FAILURE WITH HYPOXIA (HCC): Status: RESOLVED | Noted: 2018-03-29 | Resolved: 2018-07-13

## 2018-07-13 PROCEDURE — 99214 OFFICE O/P EST MOD 30 MIN: CPT | Performed by: PHYSICIAN ASSISTANT

## 2018-07-13 RX ORDER — TEMAZEPAM 7.5 MG/1
7.5 CAPSULE ORAL NIGHTLY PRN
Qty: 30 CAP | Refills: 0 | Status: SHIPPED
Start: 2018-07-13 | End: 2018-08-12

## 2018-07-13 NOTE — ASSESSMENT & PLAN NOTE
Had been on lisinopril/hctz for 6 years. Switched to amlodipine during hospitalization due to low sodium. Now getting swelling. Would like to switch back.

## 2018-07-13 NOTE — PROGRESS NOTES
Subjective:   Luc Corrales is a 63 y.o. female here today for concerns with HTN, insomnia    HTN (hypertension)  Had been on lisinopril/hctz for 6 years. Switched to amlodipine during hospitalization due to low sodium. Now getting swelling. Would like to switch back.    History of tobacco abuse  Quit smoking after hospitalization. Still vaping at a low dose.    Insomnia  Chronic, intermittent. Tried trazodone but it did not help. Would like rx for temazepam as she has tried it before and it works well for her without adverse reactions. Understands not a good choice for daily use but she states she would only need it about 2 times per week. Understands controlled substance requires q 3 month office visit. Understands no alcohol with this medicine.    Reactive airway disease without complication  Improved now that she has stopped smoking       Current medicines (including changes today)  Current Outpatient Prescriptions   Medication Sig Dispense Refill   • temazepam (RESTORIL) 7.5 MG capsule Take 1 Cap by mouth at bedtime as needed for Sleep for up to 30 days. 30 Cap 0   • beclomethasone (QVAR) 80 MCG/ACT inhaler Inhale 2 Puffs by mouth 2 times a day. 7.3 g 11   • albuterol 108 (90 Base) MCG/ACT Aero Soln inhalation aerosol Inhale 2 Puffs by mouth every 6 hours as needed for Shortness of Breath. 1 Inhaler 3   • ibuprofen (MOTRIN) 200 MG Tab Take 200 mg by mouth every 6 hours as needed for Mild Pain.       No current facility-administered medications for this visit.      She  has a past medical history of Allergy and Hypertension.    ROS   No fever/chills. No weight change. No headache/dizziness. No focal weakness. No sore throat, nasal congestion, ear pain. No chest pain, no shortness of breath, difficulty breathing. No n/v/d/c or abdominal pain. No urinary complaint. No rash or skin lesion. No joint pain or swelling.       Objective:     Blood pressure 138/92, pulse 84, temperature 37.1 °C (98.8 °F), resp. rate  "16, height 1.562 m (5' 1.5\"), weight 67 kg (147 lb 11.3 oz), SpO2 95 %, not currently breastfeeding. Body mass index is 27.46 kg/m².   Physical Exam:  Constitutional: WDWN, NAD  Skin: Warm, dry, good turgor, no rashes in visible areas.  Psych: Alert and oriented x3, normal affect and mood.        Assessment and Plan:   The following treatment plan was discussed    1. Essential hypertension  Will fill new HTN med if BMP is wnl, patient agrees  - BASIC METABOLIC PANEL; Future    2. Insomnia, unspecified type  Discussed risk/benefit and potential side effects  [facility] Aware web site evaluation: I have obtained and reviewed patient utilization report from [facility] pharmacy database prior to writing prescription for controlled substance II, III or IV. Based on the report and my clinical assessment the prescription is medically necessary.   Patient is cautioned on sedation potential of narcotic medication; no drinking, driving or operating heavy machinery while on this medication.  - temazepam (RESTORIL) 7.5 MG capsule; Take 1 Cap by mouth at bedtime as needed for Sleep for up to 30 days.  Dispense: 30 Cap; Refill: 0    3. History of tobacco abuse  Encouraged to quit \"vaping\" as well as the nicotine is still a risk factor and contributor for heart disease      Followup: Return in about 3 months (around 10/13/2018) for with Dr. Arroyo.         "

## 2018-07-13 NOTE — ASSESSMENT & PLAN NOTE
Chronic, intermittent. Tried trazodone but it did not help. Would like rx for temazepam as she has tried it before and it works well for her without adverse reactions. Understands not a good choice for daily use but she states she would only need it about 2 times per week. Understands controlled substance requires q 3 month office visit. Understands no alcohol with this medicine.

## 2018-07-16 ENCOUNTER — TELEPHONE (OUTPATIENT)
Dept: MEDICAL GROUP | Facility: MEDICAL CENTER | Age: 63
End: 2018-07-16

## 2018-07-16 DIAGNOSIS — I10 ESSENTIAL HYPERTENSION: ICD-10-CM

## 2018-07-16 DIAGNOSIS — E87.1 HYPONATREMIA: ICD-10-CM

## 2018-07-16 RX ORDER — LISINOPRIL 10 MG/1
10 TABLET ORAL DAILY
Qty: 30 TAB | Refills: 0 | Status: SHIPPED | OUTPATIENT
Start: 2018-07-16 | End: 2018-08-31

## 2018-07-16 NOTE — TELEPHONE ENCOUNTER
Pt said she was taking lisinopril, she checked her husbands medication because she says they were taking the same thing, states it was a 25 mg dose

## 2018-07-16 NOTE — TELEPHONE ENCOUNTER
"1. Caller Name: Luc                                         Call Back Number: [phone] (home)       Patient approves a detailed voicemail message: yes    Patient called stating that she spoke with Melissa regarding her bp meds.   \"Per pt, states that she thinks she may be possibly having an allergic reaction to the amlodipine and thinks that the medication is not working and causing her to swell up like a balloon.\"  Patient requesting to put back on her old medication Lisinopril/Hctz and have it sent to her pharmacy.  Pt states she has been taking it for 6 years and it worked well with her blood pressure and it help with the swelling. Please advise, thank you.      "

## 2018-07-17 NOTE — TELEPHONE ENCOUNTER
Patient has called in requesting that we restart her lisinopril HCTZ   She saw my collegue who recommended that the patient do labs prior to restarting BP medication which I agree with and the patient is refusing to do this      I can restart her ACEI but we cannot use HCTZ because that caused significant hyponatremia (low sodium) see labs, she states she cannot tolerate CCB because it caused swelling.  I continue to recommend that she do lab, I will send in her ACEI, I have recommend that she schedule a follow up for blood pressure check and lab review,  whoever see's her for follow up will need to use an alternative like a loop diuretic (spirinolactone) or BB  Given her history of hyponatremia   If the hyponatremia is persistent despite cessation of the thiazide diuretic we will need to consider further work up of the hyponatremia and nephrology consultation

## 2018-07-17 NOTE — TELEPHONE ENCOUNTER
I spoke with pt and she was upset stating that she was just seen on Friday and all she wants is a diuretic and to only see you Dr. Arroyo.  Dr. Arroyo, please advise.....

## 2018-07-17 NOTE — TELEPHONE ENCOUNTER
Per Dr. Arroyo   Looking into this further   I can restart her ACEI but we cannot use HCTZ because that caused significant hyponatremia (low sodium) see labs, she states she cannot tolerate CCB because it caused swelling.  I need her to do labs, I will send in her aceI, please have her schedule a follow up for blood pressure check, whoever see's her for follow up will need to use an alternative like a loop diuretic (spirinolactone) or BB    (Routing comment)     I scheduled pt for Aug. 6th, pt agreed to get her labs done.   Dr. Arroyo, just FYI... I added pt to the cancellation list.

## 2018-07-31 ENCOUNTER — HOSPITAL ENCOUNTER (OUTPATIENT)
Dept: LAB | Facility: MEDICAL CENTER | Age: 63
End: 2018-07-31
Attending: PHYSICIAN ASSISTANT
Payer: COMMERCIAL

## 2018-07-31 DIAGNOSIS — I10 ESSENTIAL HYPERTENSION: ICD-10-CM

## 2018-07-31 LAB
ANION GAP SERPL CALC-SCNC: 13 MMOL/L (ref 0–11.9)
BUN SERPL-MCNC: 10 MG/DL (ref 8–22)
CALCIUM SERPL-MCNC: 9.7 MG/DL (ref 8.5–10.5)
CHLORIDE SERPL-SCNC: 91 MMOL/L (ref 96–112)
CO2 SERPL-SCNC: 26 MMOL/L (ref 20–33)
CREAT SERPL-MCNC: 0.79 MG/DL (ref 0.5–1.4)
GLUCOSE SERPL-MCNC: 98 MG/DL (ref 65–99)
POTASSIUM SERPL-SCNC: 3.9 MMOL/L (ref 3.6–5.5)
SODIUM SERPL-SCNC: 130 MMOL/L (ref 135–145)

## 2018-07-31 PROCEDURE — 36415 COLL VENOUS BLD VENIPUNCTURE: CPT

## 2018-07-31 PROCEDURE — 80048 BASIC METABOLIC PNL TOTAL CA: CPT

## 2018-08-03 ENCOUNTER — TELEPHONE (OUTPATIENT)
Dept: MEDICAL GROUP | Facility: MEDICAL CENTER | Age: 63
End: 2018-08-03

## 2018-08-03 NOTE — TELEPHONE ENCOUNTER
----- Message from Angie Joseph P.A.-C. sent at 8/3/2018  8:02 AM PDT -----  Please call patient. Sodium remains low although not as bad as before. Please have her keep her appt with Dr. Arroyo to discuss BP meds. Thanks! Angie Joseph PA-C

## 2018-08-06 ENCOUNTER — APPOINTMENT (OUTPATIENT)
Dept: MEDICAL GROUP | Facility: MEDICAL CENTER | Age: 63
End: 2018-08-06
Payer: COMMERCIAL

## 2018-08-23 ENCOUNTER — TELEPHONE (OUTPATIENT)
Dept: MEDICAL GROUP | Facility: MEDICAL CENTER | Age: 63
End: 2018-08-23

## 2018-08-23 NOTE — TELEPHONE ENCOUNTER
Pt. Left a mess asking if you'd order a test to check her thyroid. Pt's been loosing hair x few weeks and wanted to know if this is due to thyroid problems.   , I noticed that a thyroid test was ordered by a different provider in [facility] 3/2018.   Please advise... Or order the test.

## 2018-08-24 NOTE — TELEPHONE ENCOUNTER
"Per Dr. Arroyo \"I see that she doesn't have follow up with me scheduled, if she would like to discuss work up for hair loss and discuss thyroid testing we should schedule telemed to discuss prior   Thanks\"   I informed pt and she'll keep her margaret for next week.   "

## 2018-08-31 ENCOUNTER — OFFICE VISIT (OUTPATIENT)
Dept: MEDICAL GROUP | Facility: MEDICAL CENTER | Age: 63
End: 2018-08-31
Payer: COMMERCIAL

## 2018-08-31 VITALS
SYSTOLIC BLOOD PRESSURE: 138 MMHG | HEART RATE: 88 BPM | BODY MASS INDEX: 27.6 KG/M2 | WEIGHT: 150 LBS | DIASTOLIC BLOOD PRESSURE: 70 MMHG | HEIGHT: 62 IN | TEMPERATURE: 98.7 F | RESPIRATION RATE: 18 BRPM | OXYGEN SATURATION: 94 %

## 2018-08-31 DIAGNOSIS — J45.909 REACTIVE AIRWAY DISEASE WITHOUT COMPLICATION, UNSPECIFIED ASTHMA SEVERITY, UNSPECIFIED WHETHER PERSISTENT: ICD-10-CM

## 2018-08-31 DIAGNOSIS — G25.81 RESTLESS LEG SYNDROME: ICD-10-CM

## 2018-08-31 DIAGNOSIS — R30.0 DYSURIA: ICD-10-CM

## 2018-08-31 DIAGNOSIS — L65.9 HAIR LOSS: ICD-10-CM

## 2018-08-31 DIAGNOSIS — G47.00 INSOMNIA, UNSPECIFIED TYPE: ICD-10-CM

## 2018-08-31 DIAGNOSIS — E87.1 HYPONATREMIA: ICD-10-CM

## 2018-08-31 DIAGNOSIS — Z84.1 FAMILY HISTORY OF NEPHROTIC SYNDROME: ICD-10-CM

## 2018-08-31 PROCEDURE — 99214 OFFICE O/P EST MOD 30 MIN: CPT | Performed by: FAMILY MEDICINE

## 2018-08-31 RX ORDER — AMLODIPINE BESYLATE 10 MG/1
10 TABLET ORAL DAILY
COMMUNITY
End: 2019-07-25 | Stop reason: SDUPTHER

## 2018-08-31 RX ORDER — SULFAMETHOXAZOLE AND TRIMETHOPRIM 800; 160 MG/1; MG/1
1 TABLET ORAL 2 TIMES DAILY
Qty: 6 TAB | Refills: 0 | Status: SHIPPED | OUTPATIENT
Start: 2018-08-31 | End: 2018-08-31

## 2018-08-31 RX ORDER — TEMAZEPAM 30 MG/1
30 CAPSULE ORAL NIGHTLY PRN
Qty: 30 CAP | Refills: 2 | Status: SHIPPED | OUTPATIENT
Start: 2018-11-30 | End: 2018-12-20

## 2018-08-31 RX ORDER — TEMAZEPAM 30 MG/1
30 CAPSULE ORAL NIGHTLY PRN
Qty: 30 CAP | Refills: 2 | Status: SHIPPED | OUTPATIENT
Start: 2018-08-31 | End: 2018-08-31 | Stop reason: SDUPTHER

## 2018-08-31 RX ORDER — ROPINIROLE 0.25 MG/1
0.25 TABLET, FILM COATED ORAL 3 TIMES DAILY PRN
Qty: 30 TAB | Refills: 2 | Status: SHIPPED | OUTPATIENT
Start: 2018-08-31 | End: 2018-08-31
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
## 2018-08-31 NOTE — ASSESSMENT & PLAN NOTE
This is persistent despite stopping ACEI   We should initiate work up see orders including cxr (was due for follow up CXR anyways) and refer to nephrology   Work up discussed with pt in detail   Over 25 minutes spent with patient face to face, greater than 50% time spent with plan/coordination of care regarding that which is discussed in the HPI and A&P

## 2018-08-31 NOTE — ASSESSMENT & PLAN NOTE
Trazodone did not work   Risk benefit side effect discussed of requip   #90 with a refill provided  Follow up for next controlled substance refill

## 2018-09-07 ENCOUNTER — TELEPHONE (OUTPATIENT)
Dept: MEDICAL GROUP | Facility: MEDICAL CENTER | Age: 63
End: 2018-09-07

## 2018-09-07 NOTE — TELEPHONE ENCOUNTER
----- Message from Pastora Singh, Med Ass't sent at 9/7/2018  9:05 AM PDT -----  Regarding: pharmacy  Pharmacy sent a mess indicating that they've noticed that pt's on multiple rescue inhalers without filling a controller medication in the last 180 days.   I'll put it on your desk for review.

## 2018-09-18 ENCOUNTER — HOSPITAL ENCOUNTER (OUTPATIENT)
Dept: LAB | Facility: MEDICAL CENTER | Age: 63
End: 2018-09-18
Attending: FAMILY MEDICINE
Payer: COMMERCIAL

## 2018-09-18 DIAGNOSIS — R30.0 DYSURIA: ICD-10-CM

## 2018-09-18 DIAGNOSIS — I10 ESSENTIAL HYPERTENSION: ICD-10-CM

## 2018-09-18 DIAGNOSIS — E87.1 HYPONATREMIA: ICD-10-CM

## 2018-09-18 LAB
ANION GAP SERPL CALC-SCNC: 12 MMOL/L (ref 0–11.9)
APPEARANCE UR: CLEAR
BILIRUB UR QL STRIP.AUTO: NEGATIVE
BUN SERPL-MCNC: 10 MG/DL (ref 8–22)
CALCIUM SERPL-MCNC: 9.6 MG/DL (ref 8.5–10.5)
CHLORIDE SERPL-SCNC: 100 MMOL/L (ref 96–112)
CHOLEST SERPL-MCNC: 194 MG/DL (ref 100–199)
CO2 SERPL-SCNC: 22 MMOL/L (ref 20–33)
COLOR UR: YELLOW
CREAT SERPL-MCNC: 0.71 MG/DL (ref 0.5–1.4)
CREAT UR-MCNC: 38.4 MG/DL
GLUCOSE SERPL-MCNC: 105 MG/DL (ref 65–99)
GLUCOSE UR STRIP.AUTO-MCNC: NEGATIVE MG/DL
HDLC SERPL-MCNC: 98 MG/DL
KETONES UR STRIP.AUTO-MCNC: NEGATIVE MG/DL
LEUKOCYTE ESTERASE UR QL STRIP.AUTO: NEGATIVE
MICRO URNS: NORMAL
NITRITE UR QL STRIP.AUTO: NEGATIVE
OSMOLALITY SERPL: 283 MOSM/KG H2O (ref 278–298)
OSMOLALITY UR: 276 MOSM/KG H2O (ref 300–900)
PH UR STRIP.AUTO: 6 [PH]
POTASSIUM SERPL-SCNC: 4.1 MMOL/L (ref 3.6–5.5)
PROT UR QL STRIP: NEGATIVE MG/DL
RBC UR QL AUTO: NEGATIVE
SODIUM SERPL-SCNC: 134 MMOL/L (ref 135–145)
SODIUM UR-SCNC: 47 MMOL/L
SP GR UR STRIP.AUTO: 1.01
TSH SERPL DL<=0.005 MIU/L-ACNC: 3.36 UIU/ML (ref 0.38–5.33)
UROBILINOGEN UR STRIP.AUTO-MCNC: 0.2 MG/DL

## 2018-09-18 PROCEDURE — 82465 ASSAY BLD/SERUM CHOLESTEROL: CPT

## 2018-09-18 PROCEDURE — 80048 BASIC METABOLIC PNL TOTAL CA: CPT

## 2018-09-18 PROCEDURE — 83935 ASSAY OF URINE OSMOLALITY: CPT

## 2018-09-18 PROCEDURE — 84300 ASSAY OF URINE SODIUM: CPT

## 2018-09-18 PROCEDURE — 84588 ASSAY OF VASOPRESSIN: CPT

## 2018-09-18 PROCEDURE — 84443 ASSAY THYROID STIM HORMONE: CPT

## 2018-09-18 PROCEDURE — 83930 ASSAY OF BLOOD OSMOLALITY: CPT

## 2018-09-18 PROCEDURE — 36415 COLL VENOUS BLD VENIPUNCTURE: CPT

## 2018-09-18 PROCEDURE — 82570 ASSAY OF URINE CREATININE: CPT

## 2018-09-18 PROCEDURE — 83721 ASSAY OF BLOOD LIPOPROTEIN: CPT

## 2018-09-18 PROCEDURE — 81003 URINALYSIS AUTO W/O SCOPE: CPT

## 2018-09-18 PROCEDURE — 83718 ASSAY OF LIPOPROTEIN: CPT

## 2018-09-20 LAB — LDLC SERPL-MCNC: 82 MG/DL (ref 0–129)

## 2018-09-23 LAB — MISCELLANEOUS LAB RESULT MISCLAB: NORMAL

## 2018-12-19 ENCOUNTER — HOSPITAL ENCOUNTER (OUTPATIENT)
Dept: RADIOLOGY | Facility: MEDICAL CENTER | Age: 63
End: 2018-12-19
Attending: FAMILY MEDICINE
Payer: COMMERCIAL

## 2018-12-19 DIAGNOSIS — E87.1 HYPONATREMIA: ICD-10-CM

## 2018-12-19 PROCEDURE — 71046 X-RAY EXAM CHEST 2 VIEWS: CPT

## 2018-12-20 ENCOUNTER — OFFICE VISIT (OUTPATIENT)
Dept: MEDICAL GROUP | Facility: MEDICAL CENTER | Age: 63
End: 2018-12-20
Payer: COMMERCIAL

## 2018-12-20 VITALS
BODY MASS INDEX: 28.52 KG/M2 | WEIGHT: 155 LBS | HEIGHT: 62 IN | RESPIRATION RATE: 18 BRPM | HEART RATE: 85 BPM | SYSTOLIC BLOOD PRESSURE: 144 MMHG | OXYGEN SATURATION: 92 % | DIASTOLIC BLOOD PRESSURE: 82 MMHG | TEMPERATURE: 100.7 F

## 2018-12-20 DIAGNOSIS — R93.89 ABNORMAL CXR: ICD-10-CM

## 2018-12-20 DIAGNOSIS — Z84.1 FAMILY HISTORY OF NEPHROTIC SYNDROME: ICD-10-CM

## 2018-12-20 DIAGNOSIS — E87.1 HYPONATREMIA: ICD-10-CM

## 2018-12-20 DIAGNOSIS — Z87.891 HISTORY OF TOBACCO ABUSE: ICD-10-CM

## 2018-12-20 DIAGNOSIS — R50.9 FEVER AND CHILLS: ICD-10-CM

## 2018-12-20 PROBLEM — L65.9 HAIR LOSS: Status: RESOLVED | Noted: 2018-08-31 | Resolved: 2018-12-20

## 2018-12-20 PROBLEM — J45.909 REACTIVE AIRWAY DISEASE WITHOUT COMPLICATION: Status: RESOLVED | Noted: 2018-04-13 | Resolved: 2018-12-20

## 2018-12-20 PROBLEM — G25.81 RESTLESS LEG SYNDROME: Status: RESOLVED | Noted: 2018-08-31 | Resolved: 2018-12-20

## 2018-12-20 LAB
FLUAV+FLUBV AG SPEC QL IA: NORMAL
INT CON NEG: NEGATIVE
INT CON POS: POSITIVE

## 2018-12-20 PROCEDURE — 87804 INFLUENZA ASSAY W/OPTIC: CPT | Performed by: FAMILY MEDICINE

## 2018-12-20 PROCEDURE — 99214 OFFICE O/P EST MOD 30 MIN: CPT | Performed by: FAMILY MEDICINE

## 2018-12-20 RX ORDER — OSELTAMIVIR PHOSPHATE 75 MG/1
75 CAPSULE ORAL 2 TIMES DAILY
Qty: 10 CAP | Refills: 0 | Status: SHIPPED | OUTPATIENT
Start: 2018-12-20 | End: 2018-12-25

## 2018-12-20 RX ORDER — PREDNISONE 20 MG/1
TABLET ORAL
Qty: 30 TAB | Refills: 0 | Status: SHIPPED | OUTPATIENT
Start: 2018-12-20 | End: 2020-11-03
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
## 2018-12-20 NOTE — ASSESSMENT & PLAN NOTE
This is persistent despite stopping ACEI   We did CXR and she has been referred to nephrology   Work up discussed with pt in detail

## 2018-12-20 NOTE — ASSESSMENT & PLAN NOTE
She had flu already this year   We do rapid flu in clinic negative   Her symptoms are clinically consistent with flu and I would like to txt as such   She has CXR yesterday which shows no e/o bacterial infection and clinically no e/o bacterial infection   She is safe for outpatient management at this time and overall well appearing, symptomatic therapy discussed and STRICT ER precautions discussed   I however would like her to have close follow up with us as she was hospitalized for flu this year   Follow up in clinic tomorrow or Monday any available provider   If not clinically improving I have T'd up a CXR for her to do prior

## 2018-12-20 NOTE — ASSESSMENT & PLAN NOTE
Edema vs fibrosis seen on CXR   Could simply be edema in the setting of viral infection   Will repeat CXR when she is feeling well   If persistent   Will follow up on this with PFTs and echocardiogram

## 2018-12-26 DIAGNOSIS — G47.00 INSOMNIA, UNSPECIFIED TYPE: ICD-10-CM

## 2018-12-26 RX ORDER — TEMAZEPAM 30 MG/1
30 CAPSULE ORAL NIGHTLY PRN
Qty: 30 CAP | Refills: 2 | Status: SHIPPED | OUTPATIENT
Start: 2018-12-26 | End: 2019-03-26

## 2018-12-26 RX ORDER — TEMAZEPAM 30 MG/1
30 CAPSULE ORAL NIGHTLY PRN
Qty: 90 CAP | Refills: 1
Start: 2018-12-26 | End: 2019-03-26

## 2018-12-26 NOTE — TELEPHONE ENCOUNTER
Was the patient seen in the last year in this department? Yes    Does patient have an active prescription for medications requested? No     Received Request Via: Pharmacy      Patient would like a 90 supply and states she has been out for 2 weeks and is needing it.    Last sarah 11/11 we can fax if approved

## 2019-01-07 ENCOUNTER — APPOINTMENT (OUTPATIENT)
Dept: CARDIOLOGY | Facility: MEDICAL CENTER | Age: 64
End: 2019-01-07
Attending: FAMILY MEDICINE
Payer: COMMERCIAL

## 2019-03-15 ENCOUNTER — DOCUMENTATION (OUTPATIENT)
Dept: MEDICAL GROUP | Facility: MEDICAL CENTER | Age: 64
End: 2019-03-15

## 2019-03-15 NOTE — PROGRESS NOTES
Patient called today on recorded scheduled line, requesting a referral from a provider in office.  was able to transfer call to me so that I could offer patient an appointment today, and explain that she would need an appointment to request a referral as they can't issue a referral without having proper documentation. Patient got upset and stated that it was ridiculous they can't just send a referral. I was able to explain that per insurance and per policy/procedure and per provider preference, she would need an office visit. I also explained that since it had been since 7/18 she was last seen, she would need a visit for most anything at this point. I was able to offer patient an appointment today with Dr. Acuna and patient turned this down, and hung up. JH

## 2019-03-16 ENCOUNTER — APPOINTMENT (OUTPATIENT)
Dept: RADIOLOGY | Facility: IMAGING CENTER | Age: 64
End: 2019-03-16
Attending: PHYSICIAN ASSISTANT
Payer: COMMERCIAL

## 2019-03-16 ENCOUNTER — HOSPITAL ENCOUNTER (OUTPATIENT)
Facility: MEDICAL CENTER | Age: 64
End: 2019-03-16
Attending: PHYSICIAN ASSISTANT
Payer: COMMERCIAL

## 2019-03-16 ENCOUNTER — OFFICE VISIT (OUTPATIENT)
Dept: URGENT CARE | Facility: CLINIC | Age: 64
End: 2019-03-16
Payer: COMMERCIAL

## 2019-03-16 VITALS
WEIGHT: 161 LBS | SYSTOLIC BLOOD PRESSURE: 126 MMHG | RESPIRATION RATE: 16 BRPM | DIASTOLIC BLOOD PRESSURE: 80 MMHG | TEMPERATURE: 97.4 F | BODY MASS INDEX: 30.4 KG/M2 | HEART RATE: 80 BPM | HEIGHT: 61 IN | OXYGEN SATURATION: 95 %

## 2019-03-16 DIAGNOSIS — M25.532 LEFT WRIST PAIN: ICD-10-CM
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
## 2019-03-16 DIAGNOSIS — M25.522 LEFT ELBOW PAIN: ICD-10-CM

## 2019-03-16 DIAGNOSIS — S52.125A CLOSED NONDISPLACED FRACTURE OF HEAD OF LEFT RADIUS, INITIAL ENCOUNTER: ICD-10-CM

## 2019-03-16 DIAGNOSIS — R30.0 DYSURIA: ICD-10-CM

## 2019-03-16 LAB
APPEARANCE UR: CLEAR
BILIRUB UR STRIP-MCNC: NEGATIVE MG/DL
COLOR UR AUTO: YELLOW
GLUCOSE UR STRIP.AUTO-MCNC: NEGATIVE MG/DL
KETONES UR STRIP.AUTO-MCNC: NEGATIVE MG/DL
LEUKOCYTE ESTERASE UR QL STRIP.AUTO: NEGATIVE
NITRITE UR QL STRIP.AUTO: NEGATIVE
PH UR STRIP.AUTO: 5.5 [PH] (ref 5–8)
PROT UR QL STRIP: NEGATIVE MG/DL
RBC UR QL AUTO: NEGATIVE
SP GR UR STRIP.AUTO: 5.5
UROBILINOGEN UR STRIP-MCNC: 0.2 MG/DL

## 2019-03-16 PROCEDURE — 73080 X-RAY EXAM OF ELBOW: CPT | Mod: TC,LT | Performed by: PHYSICIAN ASSISTANT

## 2019-03-16 PROCEDURE — 87086 URINE CULTURE/COLONY COUNT: CPT

## 2019-03-16 PROCEDURE — 73110 X-RAY EXAM OF WRIST: CPT | Mod: TC,LT | Performed by: PHYSICIAN ASSISTANT

## 2019-03-16 PROCEDURE — 81002 URINALYSIS NONAUTO W/O SCOPE: CPT | Performed by: PHYSICIAN ASSISTANT

## 2019-03-16 PROCEDURE — 99214 OFFICE O/P EST MOD 30 MIN: CPT | Performed by: PHYSICIAN ASSISTANT

## 2019-03-16 ASSESSMENT — ENCOUNTER SYMPTOMS
HEADACHES: 0
VOMITING: 0
NUMBNESS: 0
FALLS: 1
BACK PAIN: 0
MUSCLE WEAKNESS: 1
TINGLING: 0
CHILLS: 0
FEVER: 0
ABDOMINAL PAIN: 0
DIZZINESS: 0
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
## 2019-03-18 LAB
BACTERIA UR CULT: NORMAL
SIGNIFICANT IND 70042: NORMAL
SITE SITE: NORMAL
SOURCE SOURCE: NORMAL

## 2019-04-08 ENCOUNTER — OFFICE VISIT (OUTPATIENT)
Dept: MEDICAL GROUP | Facility: MEDICAL CENTER | Age: 64
End: 2019-04-08
Payer: COMMERCIAL

## 2019-04-08 VITALS
OXYGEN SATURATION: 95 % | SYSTOLIC BLOOD PRESSURE: 124 MMHG | TEMPERATURE: 99.4 F | BODY MASS INDEX: 29.55 KG/M2 | HEART RATE: 89 BPM | HEIGHT: 61 IN | DIASTOLIC BLOOD PRESSURE: 78 MMHG | WEIGHT: 156.5 LBS

## 2019-04-08 DIAGNOSIS — S52.125D CLOSED NONDISPLACED FRACTURE OF HEAD OF LEFT RADIUS WITH ROUTINE HEALING: ICD-10-CM

## 2019-04-08 DIAGNOSIS — G47.00 INSOMNIA, UNSPECIFIED TYPE: ICD-10-CM

## 2019-04-08 DIAGNOSIS — R93.89 ABNORMAL CXR: ICD-10-CM

## 2019-04-08 PROCEDURE — 99214 OFFICE O/P EST MOD 30 MIN: CPT | Performed by: FAMILY MEDICINE

## 2019-04-08 RX ORDER — TEMAZEPAM 7.5 MG/1
7.5 CAPSULE ORAL NIGHTLY PRN
Qty: 90 CAP | Refills: 0 | Status: SHIPPED | OUTPATIENT
Start: 2019-04-08 | End: 2019-04-08 | Stop reason: CLARIF

## 2019-04-08 RX ORDER — TEMAZEPAM 30 MG/1
30 CAPSULE ORAL NIGHTLY PRN
Qty: 90 CAP | Refills: 0 | Status: SHIPPED | OUTPATIENT
Start: 2019-04-08 | End: 2019-07-07

## 2019-04-08 ASSESSMENT — PATIENT HEALTH QUESTIONNAIRE - PHQ9: CLINICAL INTERPRETATION OF PHQ2 SCORE: 0

## 2019-04-08 NOTE — PROGRESS NOTES
Subjective:     CC: The encounter diagnosis was Insomnia, unspecified type.    HPI: Patient is a 63 y.o. Female patient of Dr. Arroyo's  who presents today for refill of Temazepam.       Insomnia  The patient is here today for a refill on her Temazepam. She takes this 4-5 times per week due to insomnia. She reports good sleep hygiene. Reports cool, dark, quiet sleep environment. No screens. Has a relaxing routine at night. Uses lavender on her pillow. Is unable to sleep without the benzodiazepine. Denies any side effect from the medication.     Abnormal CXR  The patient states that she is supposed to get a chest x-ray back in December.  This was ordered by Dr. Arroyo after she had an abnormal chest x-ray which showed possible fibrosis versus edema.  The patient states that she never got this done.  She would like to have her order reprinted today.    Closed nondisplaced fracture of head of left radius with routine healing  The patient has been seen by [place] [facility].  She states that her fracture was nondisplaced, states that it is healing well.  She has a follow-up appointment in the next week or so.      Past Medical History:   Diagnosis Date   • Allergy    • Hypertension        Social History   Substance Use Topics   • Smoking status: Former Smoker     Packs/day: 2.50     Years: 40.00     Types: Cigarettes     Quit date: 3/28/2018   • Smokeless tobacco: Never Used   • Alcohol use Yes       Current Outpatient Prescriptions Ordered in [facility]   Medication Sig Dispense Refill   • temazepam (RESTORIL) 30 MG capsule Take 1 Cap by mouth at bedtime as needed for Sleep for up to 90 days. 90 Cap 0   • amLODIPine (NORVASC) 10 MG Tab Take 10 mg by mouth every day.     • predniSONE (DELTASONE) 20 MG Tab Take 3 tabs daily for 5 days (Patient not taking: Reported on 4/8/2019) 30 Tab 0   • beclomethasone (QVAR) 80 MCG/ACT inhaler Inhale 2 Puffs by mouth 2 times a day. (Patient not taking: Reported on 4/8/2019) 7.3 g 11   •  "albuterol 108 (90 Base) MCG/ACT Aero Soln inhalation aerosol Inhale 2 Puffs by mouth every 6 hours as needed for Shortness of Breath. (Patient not taking: Reported on 4/8/2019) 1 Inhaler 3   • ibuprofen (MOTRIN) 200 MG Tab Take 200 mg by mouth every 6 hours as needed for Mild Pain.       No current Epic-ordered facility-administered medications on file.        Allergies:  Patient has no known allergies.    ROS:  Pulm: no sob, no cough  CV: no chest pain, no palpitations      Objective:       Exam:  /78 (BP Location: Left arm, Patient Position: Sitting, BP Cuff Size: Adult)   Pulse 89   Temp 37.4 °C (99.4 °F) (Temporal)   Ht 1.562 m (5' 1.5\")   Wt 71 kg (156 lb 8 oz)   SpO2 95%   BMI 29.10 kg/m²  Body mass index is 29.1 kg/m².    General: Normal appearing. No distress.  HEAD: NCAT  EYES: conjunctiva clear, lids without ptosis, pupils equal and reactive to light  EARS: ears normal shape and contour.  MOUTH: normal dentition   Neck:  Normal ROM  Pulmonary: Normal effort. Normal respiratory rate.  Cardiovascular: Well perfused. No LE edema  Neurologic: Grossly normal, no focal deficits  Skin: Warm and dry.  No obvious lesions.  Musculoskeletal: Normal gait and station.   Psych: Normal mood and affect. Alert and oriented x3. Judgment and insight is normal.    Assessment & Plan:     63 y.o. female with the following -     1. Insomnia, unspecified type  Chronic problem, controlled with the temazepam.  The patient requests a refill today.  I gave her a 90-day prescription.  The patient will need to follow-up with her primary care doctor in 3 months.  - temazepam (RESTORIL) 30 MG capsule; Take 1 Cap by mouth at bedtime as needed for Sleep for up to 90 days.  Dispense: 90 Cap; Refill: 0    2. Abnormal CXR  Chronic problem.  Patient was supposed to get an x-ray back in December, she did not get this done.  I did reprint the order for her she states that she will get this done as soon as she can.  She has not " complained of any shortness of breath.    3. Closed nondisplaced fracture of head of left radius with routine healing  Chronic problem, the patient is being [facility] by [facility].  She states it was nondisplaced and is healing well.      Return in about 3 months (around 7/8/2019) for Insomnia.    Please note that this dictation was created using voice recognition software. I have made every reasonable attempt to correct obvious errors, but I expect that there are errors of grammar and possibly content that I did not discover before finalizing the note.

## 2019-04-08 NOTE — ASSESSMENT & PLAN NOTE
The patient is here today for a refill on her Temazepam. She takes this 3-4 times per week due to insomnia. She reports good sleep hygiene. Reports cool, dark, quiet sleep environment. No screens. Has a relaxing routine at night.

## 2019-04-08 NOTE — ASSESSMENT & PLAN NOTE
The patient states that she is supposed to get a chest x-ray back in December.  This was ordered by Dr. Arroyo after she had an abnormal chest x-ray which showed possible fibrosis versus edema.  The patient states that she never got this done.  She would like to have her order reprinted today.

## 2019-04-08 NOTE — ASSESSMENT & PLAN NOTE
The patient has been seen by [facility].  She states that her fracture was nondisplaced, states that it is healing well.  She has a follow-up appointment in the next week or so.

## 2019-04-25 RX ORDER — AMLODIPINE BESYLATE 10 MG/1
10 TABLET ORAL DAILY
Qty: 90 TAB | Refills: 3 | Status: SHIPPED | OUTPATIENT
Start: 2019-04-25

## 2019-05-31 ENCOUNTER — TELEPHONE (OUTPATIENT)
Dept: MEDICAL GROUP | Facility: MEDICAL CENTER | Age: 64
End: 2019-05-31

## 2019-05-31 DIAGNOSIS — K13.70 ORAL LESION: ICD-10-CM

## 2019-05-31 NOTE — TELEPHONE ENCOUNTER
----- Message from Nola Velazquez sent at 5/31/2019  1:42 PM PDT -----  Regarding: URGENT REFERRAL   Patient came in today because said that she called Thursday saying she needs an urgent referral to oral surgery for possible cancer. She didn't hear back from anyone and I advised that things like this do take time. I copied the location where the referral needs to go to and left it on Adore Me's desk

## 2019-06-03 NOTE — TELEPHONE ENCOUNTER
Phone Number Called: [phone] (home)     Call outcome: spoke to patient regarding message below    Message: Pt. States she went to the dentist and they noticed a strange spot in her mouth and would like for her to get it checked out.  Left Referral information on  desk.

## 2019-06-03 NOTE — TELEPHONE ENCOUNTER
Left pt a mess informing of:  1) Referral has been placed and it is in process.  2) It's best that she gets seen at the clinic in the meantime. Pt. To contact scheduling for an appointment.

## 2019-06-07 ENCOUNTER — TELEPHONE (OUTPATIENT)
Dept: MEDICAL GROUP | Facility: MEDICAL CENTER | Age: 64
End: 2019-06-07

## 2019-06-07 NOTE — TELEPHONE ENCOUNTER
Phone Number Called: [phone] (home)     Call outcome: left message for patient to call back regarding message below    Message: Pt. insurance contacted us stating Dr. Williamson is not covered in Network for oral surgery. They gave us the names of two other surgeons in network Dr. Duncan Palma and Dr. Eduardo Greco. I left pt. A message asking if it was ok she goes to see one of them. Will also send WHOO[facility]art Message.

## 2019-06-10 ENCOUNTER — TELEPHONE (OUTPATIENT)
Dept: MEDICAL GROUP | Facility: MEDICAL CENTER | Age: 64
End: 2019-06-10

## 2019-06-10 DIAGNOSIS — K08.9 TOOTH DISORDER: ICD-10-CM

## 2019-06-10 NOTE — TELEPHONE ENCOUNTER
----- Message from Yuridia Polanco sent at 6/7/2019  4:23 PM PDT -----  Yes a new referral will have to be placed.  -Yuirdia  ----- Message -----  From: Brenda Nelson, Med Ass't  Sent: 6/7/2019   3:42 PM  To: Yuridia Polanco    Pt. Would like to request a different Doctor for her referral to Oral Surgery. She would like to request     Dr. Eduardo Greco  [address] [place] [address].  [place] [address]  Ph.[phone]  Fax.[phone]    Do we need to submit another referral or are you able to change the provider. Please let us know.

## 2019-07-25 ENCOUNTER — OFFICE VISIT (OUTPATIENT)
Dept: MEDICAL GROUP | Facility: MEDICAL CENTER | Age: 64
End: 2019-07-25
Payer: COMMERCIAL

## 2019-07-25 VITALS
RESPIRATION RATE: 14 BRPM | DIASTOLIC BLOOD PRESSURE: 72 MMHG | HEART RATE: 84 BPM | OXYGEN SATURATION: 95 % | WEIGHT: 158 LBS | BODY MASS INDEX: 29.83 KG/M2 | HEIGHT: 61 IN | TEMPERATURE: 98.3 F | SYSTOLIC BLOOD PRESSURE: 122 MMHG

## 2019-07-25 DIAGNOSIS — I10 ESSENTIAL HYPERTENSION: ICD-10-CM

## 2019-07-25 DIAGNOSIS — K13.70 ORAL LESION: ICD-10-CM

## 2019-07-25 DIAGNOSIS — G47.00 INSOMNIA, UNSPECIFIED TYPE: ICD-10-CM

## 2019-07-25 PROCEDURE — 99214 OFFICE O/P EST MOD 30 MIN: CPT | Performed by: FAMILY MEDICINE

## 2019-07-25 RX ORDER — AMLODIPINE BESYLATE 10 MG/1
10 TABLET ORAL DAILY
Qty: 90 TAB | Refills: 3 | Status: SHIPPED | OUTPATIENT
Start: 2019-07-25 | End: 2020-10-20 | Stop reason: SDUPTHER

## 2019-07-25 RX ORDER — TEMAZEPAM 30 MG/1
30 CAPSULE ORAL NIGHTLY PRN
Qty: 30 CAP | Refills: 2 | Status: SHIPPED | OUTPATIENT
Start: 2019-07-25 | End: 2019-10-02 | Stop reason: SDUPTHER

## 2019-07-25 NOTE — ASSESSMENT & PLAN NOTE
She had bx by oral surgeon done on Monday   I would like her to call with results so that we can coordinate her care if needed

## 2019-07-25 NOTE — ASSESSMENT & PLAN NOTE
Refill restoril she has been taking for years  She is dependent   Risk benefit side effect discussed

## 2019-07-25 NOTE — PROGRESS NOTES
This medical record contains text that has been entered with the assistance of computer voice recognition and dictation software.  Therefore, it may contain unintended errors in text, spelling, punctuation, or grammar        Chief Complaint   Patient presents with   • Medication Refill     amlodipine/ Tamazepam        Luc Corrales is a 64 y.o. female here evaluation and management of:     htn follow up   Just had bx in her mouth smoker new issue  Refill sleep agent     Current Outpatient Prescriptions   Medication Sig Dispense Refill   • temazepam (RESTORIL) 30 MG capsule Take 1 Cap by mouth at bedtime as needed for Sleep for up to 90 days. 30 Cap 2   • amLODIPine (NORVASC) 10 MG Tab TAKE 1 TAB BY MOUTH EVERY DAY. 90 Tab 3   • predniSONE (DELTASONE) 20 MG Tab Take 3 tabs daily for 5 days (Patient not taking: Reported on 4/8/2019) 30 Tab 0   • amLODIPine (NORVASC) 10 MG Tab Take 10 mg by mouth every day.     • beclomethasone (QVAR) 80 MCG/ACT inhaler Inhale 2 Puffs by mouth 2 times a day. (Patient not taking: Reported on 4/8/2019) 7.3 g 11   • albuterol 108 (90 Base) MCG/ACT Aero Soln inhalation aerosol Inhale 2 Puffs by mouth every 6 hours as needed for Shortness of Breath. (Patient not taking: Reported on 4/8/2019) 1 Inhaler 3   • ibuprofen (MOTRIN) 200 MG Tab Take 200 mg by mouth every 6 hours as needed for Mild Pain.       No current facility-administered medications for this visit.      Patient Active Problem List    Diagnosis Date Noted   • Oral lesion 07/25/2019   • Closed nondisplaced fracture of head of left radius with routine healing 04/08/2019   • Abnormal CXR 12/20/2018   • Fever and chills 12/20/2018   • Family history of nephrotic syndrome 08/31/2018   • Insomnia 04/13/2018   • Hyponatremia 03/28/2018   • HTN (hypertension) 03/28/2018   • History of tobacco abuse 03/28/2018     Past Surgical History:   Procedure Laterality Date   • ABDOMINAL EXPLORATION     • APPENDECTOMY     • PRIMARY C SECTION    "     Social History   Substance Use Topics   • Smoking status: Former Smoker     Packs/day: 2.50     Years: 40.00     Types: Cigarettes     Quit date: 3/28/2018   • Smokeless tobacco: Never Used   • Alcohol use Yes     Family History   Problem Relation Age of Onset   • Hypertension Mother    • Hyperlipidemia Mother    • Hypertension Father    • Heart Disease Father    • Diabetes Father    • Diabetes Brother    • Hypertension Brother    • Hyperlipidemia Brother            ROS  No n/v  all review of system completed and negative except for those listed above     Objective:     /72 (BP Location: Left arm, Patient Position: Sitting, BP Cuff Size: Adult)   Pulse 84   Temp 36.8 °C (98.3 °F) (Temporal)   Resp 14   Ht 1.549 m (5' 1\")   Wt 71.7 kg (158 lb)   SpO2 95%  Body mass index is 29.85 kg/m².  Physical Exam:        GEN: comfortable, alert and oriented, well nourished, well developed, in no apparent distress   HEENT: NCAT, eyes: pupils equal and reactive, sclera white, EOMIT, good dentition  HEART: limbs warm and well perfused, regular rate, no JVD, no lower extremity edema  LUNGS: speaking in full sentences, not in apparent respiratory distress, no audible wheezes  MSK: normal tone and bulk, no swelling of the joints, gait steady and normal         Assessment and Plan:   The following treatment plan was discussed        Problem List Items Addressed This Visit     HTN (hypertension)     At goal continue current meds   Labs needed               Insomnia     Refill restoril she has been taking for years  She is dependent   Risk benefit side effect discussed               Relevant Medications    temazepam (RESTORIL) 30 MG capsule    Other Relevant Orders    Lipid Profile    Basic Metabolic Panel    MICROALBUMIN CREAT RATIO URINE    Oral lesion     She had bx by oral surgeon done on Monday   I would like her to call with results so that we can coordinate her care if needed                         Instructed to " follow up if symptoms worsen or fail to improve, ER/UC precautions discussed as well    Isadora Arroyo MD  [facility], [address]   [place] [address]  Phone: [phone]

## 2019-08-19 ENCOUNTER — TELEPHONE (OUTPATIENT)
Dept: MEDICAL GROUP | Facility: MEDICAL CENTER | Age: 64
End: 2019-08-19

## 2019-08-19 NOTE — TELEPHONE ENCOUNTER
Colorectal Care Gap Outreach    1. Confirmed patient is between the ages of 50-75: YES     2. Confirmed that patient IS overdue or due soon for colorectal cancer screening: YES     3. Were orders placed within the last 12 months to complete screening: YES     Phone Number Called: [phone] (home)   Call outcome: Patient is NOT interested in completing any colorectal cancer screenings.   ____________________________________________________________________    Colon Cancer Screening Guidelines:     Important: If patient has any history of colon polyps or family history of colorectal cancer, FIT and Cologuard are NOT appropriate options. A colonoscopy is the recommended test for this set of patients.    • Colonoscopy  o Always recommend colonoscopy first.   o A colonoscopy is recommended over the other tests because it provides direct visualization of the colon and if there are small polyps these can also be removed with one procedure.  o If negative and no family history, could be cleared for 10 years.     • Cologuard/FIT  o Cologuard is completed once every 3 years.  o FIT is completed annually.  o If positive, Cologuard and FIT will require a diagnostic colonoscopy. Screening colonoscopies are classically covered by insurances, however, diagnostic colonoscopies may result in a bill.

## 2019-09-11 NOTE — CARE PLAN
Denied both  Too early; Rx's sent 8/20/2019 for 2 months  Lucía WHITE RN     Problem: Safety  Goal: Will remain free from injury  Outcome: PROGRESSING AS EXPECTED  Pt free from injury.  Calls appropriately.     Problem: Knowledge Deficit  Goal: Knowledge of disease process/condition, treatment plan, diagnostic tests, and medications will improve  Outcome: PROGRESSING AS EXPECTED  Pt updated on POC.  Verbalizes understanding.

## 2019-10-02 ENCOUNTER — HOSPITAL ENCOUNTER (OUTPATIENT)
Dept: RADIOLOGY | Facility: MEDICAL CENTER | Age: 64
End: 2019-10-02
Attending: FAMILY MEDICINE
Payer: COMMERCIAL

## 2019-10-02 ENCOUNTER — TELEPHONE (OUTPATIENT)
Dept: MEDICAL GROUP | Facility: MEDICAL CENTER | Age: 64
End: 2019-10-02

## 2019-10-02 ENCOUNTER — OFFICE VISIT (OUTPATIENT)
Dept: MEDICAL GROUP | Facility: MEDICAL CENTER | Age: 64
End: 2019-10-02
Payer: COMMERCIAL

## 2019-10-02 VITALS
TEMPERATURE: 97.7 F | HEART RATE: 78 BPM | OXYGEN SATURATION: 94 % | WEIGHT: 158 LBS | RESPIRATION RATE: 14 BRPM | DIASTOLIC BLOOD PRESSURE: 68 MMHG | HEIGHT: 62 IN | BODY MASS INDEX: 29.08 KG/M2 | SYSTOLIC BLOOD PRESSURE: 124 MMHG

## 2019-10-02 DIAGNOSIS — R07.89 ATYPICAL CHEST PAIN: ICD-10-CM

## 2019-10-02 DIAGNOSIS — Z00.00 PREVENTATIVE HEALTH CARE: ICD-10-CM

## 2019-10-02 DIAGNOSIS — G47.00 INSOMNIA, UNSPECIFIED TYPE: ICD-10-CM

## 2019-10-02 DIAGNOSIS — Z87.891 HISTORY OF TOBACCO ABUSE: ICD-10-CM

## 2019-10-02 DIAGNOSIS — Z71.84 TRAVEL ADVICE ENCOUNTER: ICD-10-CM

## 2019-10-02 PROCEDURE — 99214 OFFICE O/P EST MOD 30 MIN: CPT | Performed by: FAMILY MEDICINE

## 2019-10-02 PROCEDURE — 71046 X-RAY EXAM CHEST 2 VIEWS: CPT

## 2019-10-02 PROCEDURE — 93005 ELECTROCARDIOGRAM TRACING: CPT | Performed by: FAMILY MEDICINE

## 2019-10-02 RX ORDER — AZITHROMYCIN 250 MG/1
TABLET, FILM COATED ORAL
Qty: 6 TAB | Refills: 0 | Status: SHIPPED | OUTPATIENT
Start: 2019-10-02 | End: 2020-11-03

## 2019-10-02 RX ORDER — PREDNISONE 20 MG/1
TABLET ORAL
Qty: 15 TAB | Refills: 0 | Status: SHIPPED | OUTPATIENT
Start: 2019-10-02 | End: 2020-11-03

## 2019-10-02 RX ORDER — TEMAZEPAM 30 MG/1
30 CAPSULE ORAL NIGHTLY PRN
Qty: 30 CAP | Refills: 2 | Status: SHIPPED | OUTPATIENT
Start: 2019-10-02 | End: 2019-12-31

## 2019-10-02 NOTE — PROGRESS NOTES
This medical record contains text that has been entered with the assistance of computer voice recognition and dictation software.  Therefore, it may contain unintended errors in text, spelling, punctuation, or grammar        Chief Complaint   Patient presents with   • Pleurisy     left side        Luc Corrales is a 64 y.o. female here evaluation and management of:     New problem x 2 days   Pain chest wall with twisting coughing sneezing   No pain with deep inspiration   No pain with exertion   No anginal   No sob no cough     Refill sleep agent     Current Outpatient Medications   Medication Sig Dispense Refill   • temazepam (RESTORIL) 30 MG capsule Take 1 Cap by mouth at bedtime as needed for Sleep for up to 90 days. 30 Cap 2   • amLODIPine (NORVASC) 10 MG Tab TAKE 1 TAB BY MOUTH EVERY DAY. 90 Tab 3   • ibuprofen (MOTRIN) 200 MG Tab Take 200 mg by mouth every 6 hours as needed for Mild Pain.     • amLODIPine (NORVASC) 10 MG Tab Take 1 Tab by mouth every day. 90 Tab 3   • predniSONE (DELTASONE) 20 MG Tab Take 3 tabs daily for 5 days (Patient not taking: Reported on 4/8/2019) 30 Tab 0   • beclomethasone (QVAR) 80 MCG/ACT inhaler Inhale 2 Puffs by mouth 2 times a day. (Patient not taking: Reported on 4/8/2019) 7.3 g 11   • albuterol 108 (90 Base) MCG/ACT Aero Soln inhalation aerosol Inhale 2 Puffs by mouth every 6 hours as needed for Shortness of Breath. (Patient not taking: Reported on 4/8/2019) 1 Inhaler 3     No current facility-administered medications for this visit.      Patient Active Problem List    Diagnosis Date Noted   • Atypical chest pain 10/02/2019   • Oral lesion 07/25/2019   • Closed nondisplaced fracture of head of left radius with routine healing 04/08/2019   • Abnormal CXR 12/20/2018   • Fever and chills 12/20/2018   • Family history of nephrotic syndrome 08/31/2018   • Insomnia 04/13/2018   • Hyponatremia 03/28/2018   • HTN (hypertension) 03/28/2018   • History of tobacco abuse 03/28/2018  "    Past Surgical History:   Procedure Laterality Date   • ABDOMINAL EXPLORATION     • APPENDECTOMY     • PRIMARY C SECTION        Social History     Tobacco Use   • Smoking status: Former Smoker     Packs/day: 2.50     Years: 40.00     Pack years: 100.00     Types: Cigarettes     Last attempt to quit: 3/28/2018     Years since quittin.5   • Smokeless tobacco: Never Used   Substance Use Topics   • Alcohol use: Yes   • Drug use: No     Family History   Problem Relation Age of Onset   • Hypertension Mother    • Hyperlipidemia Mother    • Hypertension Father    • Heart Disease Father    • Diabetes Father    • Diabetes Brother    • Hypertension Brother    • Hyperlipidemia Brother            ROS  No n/v  all review of system completed and negative except for those listed above     Objective:     /68 (BP Location: Right arm, Patient Position: Sitting, BP Cuff Size: Adult)   Pulse 78   Temp 36.5 °C (97.7 °F) (Temporal)   Resp 14   Ht 1.575 m (5' 2\")   Wt 71.7 kg (158 lb)   SpO2 94%  Body mass index is 28.9 kg/m².  Physical Exam:      Constitutional: Alert, no distress.  Skin: Warm, dry, good turgor, no rashes in visible areas.  Eye: Equal, round and reactive, conjunctiva clear, lids normal.  ENMT: Lips without lesions, good dentition, oropharynx clear.  Neck: Trachea midline, no masses, no thyromegaly. No cervical or supraclavicular lymphadenopathy.  Respiratory: Unlabored respiratory effort, lungs clear to auscultation, no wheezes, no ronchi.  Cardiovascular: Normal S1, S2, no murmur, no edema.  Abdomen: Soft, non-tender, no masses, no hepatosplenomegaly.  Psych: Alert and oriented x3, normal affect and mood.          Assessment and Plan:   The following treatment plan was discussed        Problem List Items Addressed This Visit     History of tobacco abuse     She is candidate for low dose CT for lung cancer screening   She declines at this time as she does not want to do extra appointment with cancer " coordinator   10+ min face to face 11-11:15 discussing this          Atypical chest pain     Atypical in that she feels well during exertion   And symptoms occur throughout the day rather than intermittently   I am not concerned of clinically significant PE as her HR and pulse ox is normal   EKG performed in clinic today   CXR and labs ordered  Follow up in clinic to review results (patient will be traveling and declines this)   Strict ER precautions   Follow up if symptoms worsen or fail to improve  Sounds like most likely MSK as her symptom onset correlated with moving heavy lifting for garage sale  Also seems positional     Over 25 minutes spent with patient face to face, greater than 50% time spent with plan/coordination of care regarding that which is discussed in the HPI and A&P                       Instructed to follow up if symptoms worsen or fail to improve, ER/UC precautions discussed as well    Isadora Arroyo MD  [facility], Family Medicine   [address] [place] [address]   [place] [address]  Phone: [phone]

## 2019-10-02 NOTE — TELEPHONE ENCOUNTER
Phone Number Called: [phone] (home)     Call outcome: spoke to patient regarding message below    Message: Pt notified medication sent in    Isadora Arroyo M.D.  You Just now (3:14 PM)      Cool I sent them in

## 2019-10-02 NOTE — TELEPHONE ENCOUNTER
Spoke with pt and informed about her test results. Pt. Asked if you'd send the prescription/s to the pharmacy since she plans to leave out of town tomorrow.  Pt. Is aware to follow up with you within the next 1-2 weeks to discuss symptoms.  Dr. Arroyo, please note.

## 2019-10-02 NOTE — ASSESSMENT & PLAN NOTE
Atypical in that she feels well during exertion   And symptoms occur throughout the day rather than intermittently   I am not concerned of clinically significant PE as her HR and pulse ox is normal   EKG performed in clinic today   CXR and labs ordered  Follow up in clinic to review results (patient will be traveling and declines this)   Strict ER precautions   Follow up if symptoms worsen or fail to improve  Sounds like most likely MSK as her symptom onset correlated with moving heavy lifting for garage sale  Also seems positional     Over 25 minutes spent with patient face to face, greater than 50% time spent with plan/coordination of care regarding that which is discussed in the HPI and A&P

## 2019-10-02 NOTE — ASSESSMENT & PLAN NOTE
She is candidate for low dose CT for lung cancer screening   She declines at this time as she does not want to do extra appointment with cancer coordinator   10+ min face to face 11-11:15 discussing this

## 2019-10-02 NOTE — TELEPHONE ENCOUNTER
----- Message from Isadora Arroyo M.D. sent at 10/2/2019  2:42 PM PDT -----  Can someone call patient   Overall cxr normal   They do see a linear bibasilar atelectasis or scarring which is a very non specific finding and I doubt that this is clinically significant nor do I believe that this is related at all to her current symptoms   Since she is going out of town I can call in some steroids and abx for her to fill and carry with her in case she develops cough /fever like a pneumonia bronchitis   Otherwise follow up with me in clinic 1-2 weeks to discuss symptoms further

## 2020-07-24 DIAGNOSIS — G47.00 INSOMNIA, UNSPECIFIED TYPE: ICD-10-CM

## 2020-07-24 RX ORDER — TEMAZEPAM 30 MG/1
30 CAPSULE ORAL NIGHTLY PRN
Qty: 3 CAP | OUTPATIENT
Start: 2020-07-24 | End: 2020-10-22

## 2020-07-24 NOTE — TELEPHONE ENCOUNTER
Received request via: Patient    Was the patient seen in the last year in this department? Yes    Does the patient have an active prescription (recently filled or refills available) for medication(s) requested? no        last fill 01/14/2020    Informed pt. She needed to be seen since she hasnt been seen sincelast year for her Temazepam and it is a controlled substance.Pt. Requested 3 day supply until she can see  until Tuesday. informed pt. Since it is a controlled substance I can ask another provider but they more than likely wont fill it. She then became angry and stated '' well then I guess I just wont sleep until then and I need sleep '' she then also stated '' well if they say no I guess ill just have to take some of my husbands because I cant sleep '' again reiterated since it is a controlled substance I will ask another provider but they will most likely say no. Pt. Scheduled for a virtual appointment 04/28/2020.

## 2020-10-20 DIAGNOSIS — I10 ESSENTIAL HYPERTENSION: ICD-10-CM

## 2020-10-20 RX ORDER — AMLODIPINE BESYLATE 10 MG/1
10 TABLET ORAL DAILY
Qty: 30 TAB | Refills: 0 | Status: SHIPPED | OUTPATIENT
Start: 2020-10-20 | End: 2020-11-03 | Stop reason: SDUPTHER

## 2020-10-20 NOTE — TELEPHONE ENCOUNTER
Received voicemail from pt stating she needs temazepam and amlodipine refill but she is not comfortable coming into clinic at this time.   I have schedule her for vv on 11/3.  Pt states she will be running out of amlodipine. She is wondering if appropriate to get a few days worth so she does not run out before appt.   Have informed her that controlled substances require appointments to be refilled.  Please advise

## 2020-11-03 ENCOUNTER — TELEMEDICINE (OUTPATIENT)
Dept: MEDICAL GROUP | Facility: MEDICAL CENTER | Age: 65
End: 2020-11-03
Payer: COMMERCIAL

## 2020-11-03 VITALS
DIASTOLIC BLOOD PRESSURE: 79 MMHG | HEIGHT: 63 IN | WEIGHT: 160 LBS | BODY MASS INDEX: 28.35 KG/M2 | SYSTOLIC BLOOD PRESSURE: 138 MMHG

## 2020-11-03 DIAGNOSIS — Z87.891 HISTORY OF TOBACCO ABUSE: ICD-10-CM

## 2020-11-03 DIAGNOSIS — G47.00 INSOMNIA, UNSPECIFIED TYPE: ICD-10-CM

## 2020-11-03 DIAGNOSIS — E87.1 HYPONATREMIA: ICD-10-CM

## 2020-11-03 DIAGNOSIS — I10 ESSENTIAL HYPERTENSION: ICD-10-CM

## 2020-11-03 PROBLEM — R07.89 ATYPICAL CHEST PAIN: Status: RESOLVED | Noted: 2019-10-02 | Resolved: 2020-11-03

## 2020-11-03 PROCEDURE — 99214 OFFICE O/P EST MOD 30 MIN: CPT | Mod: 95,CR | Performed by: FAMILY MEDICINE

## 2020-11-03 RX ORDER — AMLODIPINE BESYLATE 10 MG/1
10 TABLET ORAL DAILY
Qty: 90 TAB | Refills: 1 | Status: SHIPPED | OUTPATIENT
Start: 2020-11-03 | End: 2021-05-18

## 2020-11-03 SDOH — HEALTH STABILITY: MENTAL HEALTH: HOW OFTEN DO YOU HAVE A DRINK CONTAINING ALCOHOL?: 4 OR MORE TIMES A WEEK

## 2020-11-03 NOTE — ASSESSMENT & PLAN NOTE
Reviewed previous CXR which showed hyperinflation   I explain that this likely represents COPD   She is open to PFTs for formal dx

## 2020-11-03 NOTE — ASSESSMENT & PLAN NOTE
"She was previously dependent on nighty benzo but has been out for about a year as no providers in our clinic including myself were comfortable managing her controlled substance prescription without an appointment   So she has been off now for a year   I explain that I am no longer managing benzo for regular /chronic use , especially in the setting of COPD   I offer small rx for infrequent use #1-2 per month and she declines \"that's just not going to cut it\"  I offer alternatives melatonin benadryl trazodone TCA she declines \"that's not going to work\"  I offer psychiatry consultation   I offer sleep consultation to tammi for underlying sleep apnea  She declines       "

## 2020-11-03 NOTE — ASSESSMENT & PLAN NOTE
I had encouraged her to consult with nephrology last year she never did   We agree to repeat labs   If persistent we will resend referral to nephrology       Over 25 minutes spent with patient face to face, greater than 50% time spent with plan/coordination of care regarding that which is discussed in the HPI and A&P

## 2020-11-03 NOTE — PROGRESS NOTES
Virtual Visit: Established Patient   This visit was conducted via Zoom using secure and encrypted videoconferencing technology. The patient was in a private location in the state of [place].    The patient's identity was confirmed and verbal consent was obtained for this virtual visit.    Subjective:   CC:   Chief Complaint   Patient presents with   • Follow-Up     1 year   • Medication Refill     temazepam, amlodipine 90 days supply       Luc Corrales is a 65 y.o. female presenting for evaluation and management of:    Request ccb refill   Apparently long overdue for visit in clinic   She was granted #90 then #30 then informed no further refills until visit in clinic   Fortunately she has home blood pressure monitor           ROS   Denies any recent fevers or chills. No nausea or vomiting. No chest pains or shortness of breath.     No Known Allergies    Current medicines (including changes today)  Current Outpatient Medications   Medication Sig Dispense Refill   • amLODIPine (NORVASC) 10 MG Tab Take 1 Tab by mouth every day. 90 Tab 1   • amLODIPine (NORVASC) 10 MG Tab TAKE 1 TAB BY MOUTH EVERY DAY. 90 Tab 3   • ibuprofen (MOTRIN) 200 MG Tab Take 200 mg by mouth every 6 hours as needed for Mild Pain.       No current facility-administered medications for this visit.        Patient Active Problem List    Diagnosis Date Noted   • Oral lesion 07/25/2019   • Closed nondisplaced fracture of head of left radius with routine healing 04/08/2019   • Abnormal CXR 12/20/2018   • Fever and chills 12/20/2018   • Family history of nephrotic syndrome 08/31/2018   • Insomnia 04/13/2018   • Hyponatremia 03/28/2018   • HTN (hypertension) 03/28/2018   • History of tobacco abuse 03/28/2018       Family History   Problem Relation Age of Onset   • Hypertension Mother    • Hyperlipidemia Mother    • Hypertension Father    • Heart Disease Father    • Diabetes Father    • Diabetes Brother    • Hypertension Brother    • Hyperlipidemia  "Brother        She  has a past medical history of Allergy and Hypertension.  She  has a past surgical history that includes abdominal exploration; primary c section; and appendectomy.       Objective:   /79 (BP Location: Left arm, Patient Position: Sitting, BP Cuff Size: Adult) Comment: pt stated  Ht 1.588 m (5' 2.5\") Comment: pt stated  Wt 72.6 kg (160 lb) Comment: pt stated  BMI 28.80 kg/m²     Physical Exam:  Constitutional: Alert, no distress, well-groomed.  Skin: No rashes in visible areas.  Eye: Round. Conjunctiva clear, lids normal. No icterus.   ENMT: Lips pink without lesions, good dentition, moist mucous membranes. Phonation normal.  Neck: No masses, no thyromegaly. Moves freely without pain.  Respiratory: Unlabored respiratory effort, no cough or audible wheeze  Psych: Alert and oriented x3, normal affect and mood.       Assessment and Plan:   The following treatment plan was discussed:     1. History of tobacco abuse  - PULMONARY FUNCTION TESTS -Test requested: Complete Pulmonary Function Test; Include MIPS/MEPS? Yes; Future    2. Essential hypertension  - Basic Metabolic Panel; Future  - Lipid Profile; Future  - MICROALBUMIN CREAT RATIO URINE; Future  - amLODIPine (NORVASC) 10 MG Tab; Take 1 Tab by mouth every day.  Dispense: 90 Tab; Refill: 1    3. Insomnia, unspecified type    4. Hyponatremia    Problem List Items Addressed This Visit     Hyponatremia     I had encouraged her to consult with nephrology last year she never did   We agree to repeat labs   If persistent we will resend referral to nephrology       Over 25 minutes spent with patient face to face, greater than 50% time spent with plan/coordination of care regarding that which is discussed in the HPI and A&P           HTN (hypertension)     Will continue CCB for now   Overdue for labs   Reminder to do q6 mo              Relevant Medications    amLODIPine (NORVASC) 10 MG Tab    Other Relevant Orders    Basic Metabolic Panel    Lipid " "Profile    MICROALBUMIN CREAT RATIO URINE    History of tobacco abuse     Reviewed previous CXR which showed hyperinflation   I explain that this likely represents COPD   She is open to PFTs for formal dx                Relevant Orders    PULMONARY FUNCTION TESTS -Test requested: Complete Pulmonary Function Test; Include MIPS/MEPS? Yes    Insomnia     She was previously dependent on nighty benzo but has been out for about a year as no providers in our clinic including myself were comfortable managing her controlled substance prescription without an appointment   So she has been off now for a year   I explain that I am no longer managing benzo for regular /chronic use , especially in the setting of COPD   I offer small rx for infrequent use #1-2 per month and she declines \"that's just not going to cut it\"  I offer alternatives melatonin benadryl trazodone TCA she declines \"that's not going to work\"  I offer psychiatry consultation   I offer sleep consultation to tammi for underlying sleep apnea  She declines                        Follow-up: No follow-ups on file.           "

## 2021-05-18 DIAGNOSIS — I10 ESSENTIAL HYPERTENSION: ICD-10-CM

## 2021-05-18 RX ORDER — AMLODIPINE BESYLATE 10 MG/1
TABLET ORAL
Qty: 90 TABLET | Refills: 1 | Status: SHIPPED | OUTPATIENT
Start: 2021-05-18